# Patient Record
Sex: FEMALE | Race: WHITE | NOT HISPANIC OR LATINO | Employment: OTHER | URBAN - METROPOLITAN AREA
[De-identification: names, ages, dates, MRNs, and addresses within clinical notes are randomized per-mention and may not be internally consistent; named-entity substitution may affect disease eponyms.]

---

## 2017-04-24 ENCOUNTER — RADIANT APPOINTMENT (OUTPATIENT)
Dept: GENERAL RADIOLOGY | Facility: CLINIC | Age: 57
End: 2017-04-24
Attending: FAMILY MEDICINE
Payer: COMMERCIAL

## 2017-04-24 ENCOUNTER — OFFICE VISIT (OUTPATIENT)
Dept: FAMILY MEDICINE | Facility: CLINIC | Age: 57
End: 2017-04-24
Payer: COMMERCIAL

## 2017-04-24 VITALS
BODY MASS INDEX: 33.02 KG/M2 | WEIGHT: 198.2 LBS | OXYGEN SATURATION: 96 % | TEMPERATURE: 98.4 F | SYSTOLIC BLOOD PRESSURE: 110 MMHG | DIASTOLIC BLOOD PRESSURE: 80 MMHG | HEIGHT: 65 IN | HEART RATE: 74 BPM

## 2017-04-24 DIAGNOSIS — R05.9 COUGH: ICD-10-CM

## 2017-04-24 DIAGNOSIS — Z12.11 COLON CANCER SCREENING: ICD-10-CM

## 2017-04-24 DIAGNOSIS — R05.9 COUGH: Primary | ICD-10-CM

## 2017-04-24 PROCEDURE — 71020 XR CHEST 2 VW: CPT

## 2017-04-24 PROCEDURE — 99213 OFFICE O/P EST LOW 20 MIN: CPT | Performed by: FAMILY MEDICINE

## 2017-04-24 RX ORDER — PREDNISONE 20 MG/1
TABLET ORAL
Qty: 16 TABLET | Refills: 0 | Status: SHIPPED | OUTPATIENT
Start: 2017-04-24 | End: 2022-01-05

## 2017-04-24 RX ORDER — CODEINE PHOSPHATE AND GUAIFENESIN 10; 100 MG/5ML; MG/5ML
2 SOLUTION ORAL 4 TIMES DAILY PRN
Qty: 180 ML | Refills: 1 | Status: SHIPPED | OUTPATIENT
Start: 2017-04-24 | End: 2022-01-05

## 2017-04-24 RX ORDER — AZITHROMYCIN 250 MG/1
TABLET, FILM COATED ORAL
Qty: 6 TABLET | Refills: 0 | Status: SHIPPED | OUTPATIENT
Start: 2017-04-24 | End: 2022-01-05

## 2017-04-24 NOTE — MR AVS SNAPSHOT
After Visit Summary   4/24/2017    Xiomara Maradiaga    MRN: [id number]           Patient Information     Date Of Birth          1960        Visit Information        Provider Department      4/24/2017 6:20 PM Sb May MD [facility]        Today's Diagnoses     Cough    -  1    Colon cancer screening          Care Instructions    ASSESSMENT:   (R05) Cough  (primary encounter diagnosis)  Comment: possible pneumonia  Plan: XR Chest 2 Views, azithromycin (ZITHROMAX) 250         MG tablet, guaiFENesin-codeine (GUAIFENESIN AC)        100-10 MG/5ML SOLN solution, predniSONE         (DELTASONE) 20 MG tablet          Take prednisone 20mg 2 pills daily for 6 days, then 1 pill daily for another 4 days.   Azithromycin 250mg, 2 pills day #1, then 1 pill daily for 4 more days.   You can take 2 tsp of the guaifenesin/codeine cough syrup up to 4 times a day as needed.  It can cause drowsiness, caution during the day or at work or driving.        Sometimes there is irritation and inflammation in the airways following a viral upper respiratory infection and the cough can linger for longer than the usual 10 days, sometimes for several weeks.    Increased fluids and humidity in the household, particularly the bedroom can help.   Some of the older antihistamine medications like chlorpheneramine, brompheneramine or clemastine (Tavist) may help for the cough.  Sometimes these are combined with a decongestant like pseudoephedrine.  These over the counter medications are available without a prescription.   Cough suppressant medications like Guaifenesin (Robitussin) with dextromethorphan (DM) can help for cough.  This over the counter medication is available without a prescription.   Codeine with guaifenesin is another option which is prescription and could cause drowsiness but it is good for at night.  Both cough medications are 4 times a day as needed.   An inhaler like ipratropium could help.   This is 2 inhalations 4 times a day to reduce the inflammation.  Recheck or call with breathing problems, if a fever develops, or getting worse over time.      (Z12.11) Colon cancer screening  Comment:   Plan: Fecal colorectal cancer screen (FIT)        Complete FIT colon cancer screening test and send in the mail.          Follow-ups after your visit        Future tests that were ordered for you today     Open Future Orders        Priority Expected Expires Ordered    Fecal colorectal cancer screen (FIT) Routine 5/15/2017 7/17/2017 4/24/2017            Who to contact     If you have questions or need follow up information about today's clinic visit or your schedule please contact [facility] directly at [phone].  Normal or non-critical lab and imaging results will be communicated to you by Aceso[employer]eehart, letter or phone within 4 business days after the clinic has received the results. If you do not hear from us within 7 days, please contact the clinic through [employer] or phone. If you have a critical or abnormal lab result, we will notify you by phone as soon as possible.  Submit refill requests through [employer]inos or call your pharmacy and they will forward the refill request to us. Please allow 3 business days for your refill to be completed.          Additional Information About Your Visit        [email or web address] Information     Deminos gives you secure access to your electronic health record. If you see a primary care provider, you can also send messages to your care team and make appointments. If you have questions, please call your primary care clinic.  If you do not have a primary care provider, please call [phone] and they will assist you.        Care EveryWhere ID     This is your Care EveryWhere ID. This could be used by other organizations to access your [place] medical records  [id number]        Your Vitals Were     Pulse Temperature Height Last Period Pulse Oximetry Breastfeeding?    74 98.4  " F (36.9  C) (Tympanic) 5' 5\" (1.651 m) 10/04/2004 96% No    BMI (Body Mass Index)                   32.98 kg/m2            Blood Pressure from Last 3 Encounters:   04/24/17 110/80   08/29/15 113/62   05/29/13 120/82    Weight from Last 3 Encounters:   04/24/17 198 lb 3.2 oz (89.9 kg)   05/29/13 201 lb 3.2 oz (91.3 kg)   02/28/13 201 lb 14.4 oz (91.6 kg)                 Today's Medication Changes          These changes are accurate as of: 4/24/17  7:39 PM.  If you have any questions, ask your nurse or doctor.               Start taking these medicines.        Dose/Directions    azithromycin 250 MG tablet   Commonly known as:  ZITHROMAX   Used for:  Cough   Started by:  Sb May MD        Azithromycin 250mg, 2 pills day #1, then 1 pill daily for 4 more days.   Quantity:  6 tablet   Refills:  0       guaiFENesin-codeine 100-10 MG/5ML Soln solution   Commonly known as:  guaiFENesin AC   Used for:  Cough   Started by:  Sb May MD        Dose:  2 tsp.   Take 10 mLs by mouth 4 times daily as needed for cough   Quantity:  180 mL   Refills:  1       predniSONE 20 MG tablet   Commonly known as:  DELTASONE   Used for:  Cough   Started by:  Sb May MD        Take prednisone 20mg 2 pills daily for 6 days, then 1 pill daily for another 4 days.   Quantity:  16 tablet   Refills:  0            Where to get your medicines      These medications were sent to [facility] [id number] [place] [address]  AT [place] [address]  [address] [facility] [place] [address]     Phone:  [phone]     predniSONE 20 MG tablet         Some of these will need a paper prescription and others can be bought over the counter.  Ask your nurse if you have questions.     Bring a paper prescription for each of these medications     azithromycin 250 MG tablet    guaiFENesin-codeine 100-10 MG/5ML Soln solution                Primary Care Provider Office Phone # Fax #    Hallie Garcia " MD Yakov [phone]       [facility] [address] [facility] [address]        Thank you!     Thank you for choosing [facility]  for your care. Our goal is always to provide you with excellent care. Hearing back from our patients is one way we can continue to improve our services. Please take a few minutes to complete the written survey that you may receive in the mail after your visit with us. Thank you!             Your Updated Medication List - Protect others around you: Learn how to safely use, store and throw away your medicines at www.disposemymeds.org.          This list is accurate as of: 4/24/17  7:39 PM.  Always use your most recent med list.                   Brand Name Dispense Instructions for use    azithromycin 250 MG tablet    ZITHROMAX    6 tablet    Azithromycin 250mg, 2 pills day #1, then 1 pill daily for 4 more days.       guaiFENesin-codeine 100-10 MG/5ML Soln solution    guaiFENesin AC    180 mL    Take 10 mLs by mouth 4 times daily as needed for cough       NO ACTIVE MEDICATIONS          predniSONE 20 MG tablet    DELTASONE    16 tablet    Take prednisone 20mg 2 pills daily for 6 days, then 1 pill daily for another 4 days.

## 2017-04-24 NOTE — NURSING NOTE
"Chief Complaint   Patient presents with     Cough       Initial /80 (BP Location: Right arm, Patient Position: Chair, Cuff Size: Adult Large)  Pulse 74  Temp 98.4  F (36.9  C) (Tympanic)  Ht 5' 5\" (1.651 m)  Wt 198 lb 3.2 oz (89.9 kg)  LMP 10/04/2004  SpO2 96%  Breastfeeding? No  BMI 32.98 kg/m2 Estimated body mass index is 32.98 kg/(m^2) as calculated from the following:    Height as of this encounter: 5' 5\" (1.651 m).    Weight as of this encounter: 198 lb 3.2 oz (89.9 kg).  Medication Reconciliation: complete    Health Maintenance that is potentially due pending provider review:  Pap Smear, Colonoscopy/FIT and have bilateral implants due to breast cancer    Will consider but willing to FIT.  Margaret Vee CMA  .    "

## 2017-04-24 NOTE — PROGRESS NOTES
SUBJECTIVE:                                                    Xiomara Maradiaga is a 56 year old female who presents to clinic today for the following health issues:  Chief Complaint   Patient presents with     Cough      RESPIRATORY SYMPTOMS      Duration: 2.5 weeks    Description  sore throat, cough, fatigue/malaise and hoarse voice, headache with cough    Severity: severe    Accompanying signs and symptoms: feels slightly short of breath     History (predisposing factors):  Past hx of breast cancer and chemotherapy    Precipitating or alleviating factors: None    Therapies tried and outcome:  otc cough syrup and cough drops.       No history of asthma.  Perhaps a little hay fever.   Course of symptoms over time: worse.   Sometimes coughs prolonged making it difficult to breathe.   No fever.      NO nasal symptoms.   Dry cough.   Seems like dyspnea on exertion running with her dog.     Patient Active Problem List    Diagnosis Date Noted     Breast cancer (H) 06/25/2012     Priority: Medium     An ultrasound-guided biopsy was done on 05/22/2012 and results showed that there were 2 lesions with an invasive ductal carcinoma grade 2 with absent angiolymphatic invasion. Estrogen and progesterone ER and CO were also negative and HER-2 was negative as well so triple negative. The third nodule in the right breast that was biopsied was negative for atypia or malignancy. MRI has been done on 06/05/2012, which showed 3 enhancing masses in the lateral right breast, largest at the 10 o'clock position 8 cm from the nipple measuring about 2.3 x 1.9 and 2.1 cm. Satellite lesion present measuring 1.5 cm and a third mass was 2.3 cm anterior to the index lesion measuring 0.9 cm. No enlarged right axillary lymph nodes were seen. Left breast was normal. She has since been scheduled to have bilateral mastectomies with a right-sided sentinel lymph node biopsy and bilateral tissue expanders to be placed on 06/25/2012. EDSON GARZA MD  "           Personal history of malignant neoplasm of breast 05/31/2012     Priority: Medium      OBJECTIVE:Blood pressure 110/80, pulse 74, temperature 98.4  F (36.9  C), temperature source Tympanic, height 5' 5\" (1.651 m), weight 198 lb 3.2 oz (89.9 kg), last menstrual period 10/04/2004, SpO2 96 %, not currently breastfeeding. BMI=Body mass index is 32.98 kg/(m^2).  GENERAL APPEARANCE ADULT: Alert, no acute distress, frequent cough  EYES: PERRL, EOM normal, conjunctiva and lids normal  HENT: Ears and TMs normal, oral mucosa and posterior oropharynx normal  NECK: No adenopathy,masses or thyromegaly  RESP: rales left lung  CV: normal rate, regular rhythm, no murmur or gallop  CXR:Possbile small infiltrate left base     ASSESSMENT:   (R05) Cough  (primary encounter diagnosis)  Comment: possible pneumonia  Plan: XR Chest 2 Views, azithromycin (ZITHROMAX) 250         MG tablet, guaiFENesin-codeine (GUAIFENESIN AC)        100-10 MG/5ML SOLN solution, predniSONE         (DELTASONE) 20 MG tablet          Take prednisone 20mg 2 pills daily for 6 days, then 1 pill daily for another 4 days.   Azithromycin 250mg, 2 pills day #1, then 1 pill daily for 4 more days.   You can take 2 tsp of the guaifenesin/codeine cough syrup up to 4 times a day as needed.  It can cause drowsiness, caution during the day or at work or driving.        Sometimes there is irritation and inflammation in the airways following a viral upper respiratory infection and the cough can linger for longer than the usual 10 days, sometimes for several weeks.    Increased fluids and humidity in the household, particularly the bedroom can help.   Some of the older antihistamine medications like chlorpheneramine, brompheneramine or clemastine (Tavist) may help for the cough.  Sometimes these are combined with a decongestant like pseudoephedrine.  These over the counter medications are available without a prescription.   Cough suppressant medications like " Guaifenesin (Robitussin) with dextromethorphan (DM) can help for cough.  This over the counter medication is available without a prescription.   Codeine with guaifenesin is another option which is prescription and could cause drowsiness but it is good for at night.  Both cough medications are 4 times a day as needed.   An inhaler like ipratropium could help.  This is 2 inhalations 4 times a day to reduce the inflammation.  Recheck or call with breathing problems, if a fever develops, or getting worse over time.      (Z12.11) Colon cancer screening  Comment:   Plan: Fecal colorectal cancer screen (FIT)        Complete FIT colon cancer screening test and send in the mail.

## 2017-04-25 NOTE — PROGRESS NOTES
Derek Solano,  The radiologist does agree that on chest x-ray, it looks like there is some pneumonia.   PLAN: Continue the azithromycin and prednisone as planned.    Let me know if not steadily improving or increasing problems.     LEONARD BILLINGSLEY MD

## 2017-04-25 NOTE — PATIENT INSTRUCTIONS
ASSESSMENT:   (R05) Cough  (primary encounter diagnosis)  Comment: possible pneumonia  Plan: XR Chest 2 Views, azithromycin (ZITHROMAX) 250         MG tablet, guaiFENesin-codeine (GUAIFENESIN AC)        100-10 MG/5ML SOLN solution, predniSONE         (DELTASONE) 20 MG tablet          Take prednisone 20mg 2 pills daily for 6 days, then 1 pill daily for another 4 days.   Azithromycin 250mg, 2 pills day #1, then 1 pill daily for 4 more days.   You can take 2 tsp of the guaifenesin/codeine cough syrup up to 4 times a day as needed.  It can cause drowsiness, caution during the day or at work or driving.        Sometimes there is irritation and inflammation in the airways following a viral upper respiratory infection and the cough can linger for longer than the usual 10 days, sometimes for several weeks.    Increased fluids and humidity in the household, particularly the bedroom can help.   Some of the older antihistamine medications like chlorpheneramine, brompheneramine or clemastine (Tavist) may help for the cough.  Sometimes these are combined with a decongestant like pseudoephedrine.  These over the counter medications are available without a prescription.   Cough suppressant medications like Guaifenesin (Robitussin) with dextromethorphan (DM) can help for cough.  This over the counter medication is available without a prescription.   Codeine with guaifenesin is another option which is prescription and could cause drowsiness but it is good for at night.  Both cough medications are 4 times a day as needed.   An inhaler like ipratropium could help.  This is 2 inhalations 4 times a day to reduce the inflammation.  Recheck or call with breathing problems, if a fever develops, or getting worse over time.      (Z12.11) Colon cancer screening  Comment:   Plan: Fecal colorectal cancer screen (FIT)        Complete FIT colon cancer screening test and send in the mail.

## 2017-11-19 ENCOUNTER — HEALTH MAINTENANCE LETTER (OUTPATIENT)
Age: 57
End: 2017-11-19

## 2018-10-01 ENCOUNTER — TELEPHONE (OUTPATIENT)
Dept: FAMILY MEDICINE | Facility: CLINIC | Age: 58
End: 2018-10-01

## 2018-10-01 DIAGNOSIS — H04.123 DRY EYES: Primary | ICD-10-CM

## 2018-10-01 NOTE — TELEPHONE ENCOUNTER
I read the note.  I think we can start with a sedimentation rate.  If abnormal will have her come see me and talk about furhter testing.

## 2018-10-01 NOTE — TELEPHONE ENCOUNTER
Xiomara says she didn't do the lab draw today but would like to do it on Wednesday. She says if Dr Cox needs to talk to Dr Williamson from [facility], his phone number is [phone].  She would like someone to let her know if a test is ordered.

## 2018-10-01 NOTE — TELEPHONE ENCOUNTER
Pt says Dr Cox is her 's doctor. She saw an eye doctor. Dr Williamson and he asked her who her doctor was and she says she listed Dr Cox because she really doesn't have a doctor. She says the eye doctor contacted Dr Cox about ordering a blood test for her. She says the test is something to test for dry eyes. She didn't know what it was but says it's also for people with dry mouth, fatigue and dry eyes. She says she is on her way to [place] right now because her  is having his blood drawn and she wants to do her blood test at the same time so wants Dr Cox to order this. She says her  had called and was told Dr Cox did get contacted by the eye doctor.  Please put in orders and let her know ASAP.

## 2018-10-03 DIAGNOSIS — H04.123 DRY EYES: ICD-10-CM

## 2018-10-03 LAB — ERYTHROCYTE [SEDIMENTATION RATE] IN BLOOD BY WESTERGREN METHOD: 12 MM/H (ref 0–30)

## 2018-10-03 PROCEDURE — 85652 RBC SED RATE AUTOMATED: CPT | Performed by: FAMILY MEDICINE

## 2018-10-03 PROCEDURE — 36415 COLL VENOUS BLD VENIPUNCTURE: CPT | Performed by: FAMILY MEDICINE

## 2020-02-28 ENCOUNTER — TELEPHONE (OUTPATIENT)
Dept: FAMILY MEDICINE | Facility: CLINIC | Age: 60
End: 2020-02-28

## 2020-02-28 NOTE — TELEPHONE ENCOUNTER
S-(situation):  is calling to report that wife has diarrhea and is throwing up.   She has had diarrhea and vomiting every 30-40 minutes since 0600.  Abdominal pain 7-8/10.  Every time drinks something it triggers her to throw up.   She was fine last night.  This is sudden onset this morning.  Not sure color of urine.  Feels weak.    B-(background): had chicken from [employer] last night.    A-(assessment): vomiting and diarrhea every 30-40 minutes since 0600, unknown etiology    R-(recommendations): advised to try to hydrate.  If not and not urinating , needs fluids, go to the ED with a .  She says her  does not drive and she should not drive.  Reviewed general home care instructions for influenza like illness as well.  Margaret Berry RN

## 2020-02-28 NOTE — TELEPHONE ENCOUNTER
Reason for call:  Patient reporting a symptom    Symptom or request: Vomiting , Diarrhea & Chills - This started this am.    Phone Number patient can be reached at:  Home number on file [phone] (home)    Best Time:  Any Time      Can we leave a detailed message on this number:  YES    Call taken on 2/28/2020 at 2:03 PM by Stacie Marti

## 2020-03-02 ENCOUNTER — HEALTH MAINTENANCE LETTER (OUTPATIENT)
Age: 60
End: 2020-03-02

## 2020-10-01 ENCOUNTER — HOSPITAL ENCOUNTER (EMERGENCY)
Facility: CLINIC | Age: 60
Discharge: HOME | End: 2020-10-01
Attending: NURSE PRACTITIONER | Admitting: NURSE PRACTITIONER
Payer: COMMERCIAL

## 2020-10-01 VITALS
HEIGHT: 65 IN | RESPIRATION RATE: 18 BRPM | TEMPERATURE: 97.8 F | OXYGEN SATURATION: 99 % | SYSTOLIC BLOOD PRESSURE: 185 MMHG | HEART RATE: 61 BPM | DIASTOLIC BLOOD PRESSURE: 102 MMHG | BODY MASS INDEX: 32.49 KG/M2 | WEIGHT: 195 LBS

## 2020-10-01 DIAGNOSIS — S11.91XA LACERATION OF NECK, INITIAL ENCOUNTER: ICD-10-CM
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
## 2020-10-01 DIAGNOSIS — S61.012A LACERATION OF LEFT THUMB WITHOUT FOREIGN BODY WITHOUT DAMAGE TO NAIL, INITIAL ENCOUNTER: ICD-10-CM

## 2020-10-01 PROCEDURE — 12004 RPR S/N/AX/GEN/TRK7.6-12.5CM: CPT | Performed by: NURSE PRACTITIONER

## 2020-10-01 PROCEDURE — 90471 IMMUNIZATION ADMIN: CPT

## 2020-10-01 PROCEDURE — 99283 EMERGENCY DEPT VISIT LOW MDM: CPT | Mod: 25 | Performed by: NURSE PRACTITIONER

## 2020-10-01 PROCEDURE — 12042 INTMD RPR N-HF/GENIT2.6-7.5: CPT | Performed by: NURSE PRACTITIONER

## 2020-10-01 PROCEDURE — 90715 TDAP VACCINE 7 YRS/> IM: CPT | Performed by: NURSE PRACTITIONER

## 2020-10-01 PROCEDURE — 250N000011 HC RX IP 250 OP 636: Performed by: NURSE PRACTITIONER

## 2020-10-01 PROCEDURE — 90471 IMMUNIZATION ADMIN: CPT | Performed by: NURSE PRACTITIONER

## 2020-10-01 PROCEDURE — 250N000013 HC RX MED GY IP 250 OP 250 PS 637: Performed by: NURSE PRACTITIONER

## 2020-10-01 RX ORDER — DOXYCYCLINE 100 MG/1
100 CAPSULE ORAL ONCE
Status: COMPLETED | OUTPATIENT
Start: 2020-10-01 | End: 2020-10-01

## 2020-10-01 RX ORDER — DOXYCYCLINE 100 MG/1
100 TABLET ORAL 2 TIMES DAILY
Qty: 10 TABLET | Refills: 0 | Status: SHIPPED | OUTPATIENT
Start: 2020-10-01 | End: 2020-10-06

## 2020-10-01 RX ADMIN — DOXYCYCLINE HYCLATE 100 MG: 100 CAPSULE ORAL at 22:21

## 2020-10-01 RX ADMIN — CLOSTRIDIUM TETANI TOXOID ANTIGEN (FORMALDEHYDE INACTIVATED), CORYNEBACTERIUM DIPHTHERIAE TOXOID ANTIGEN (FORMALDEHYDE INACTIVATED), BORDETELLA PERTUSSIS TOXOID ANTIGEN (GLUTARALDEHYDE INACTIVATED), BORDETELLA PERTUSSIS FILAMENTOUS HEMAGGLUTININ ANTIGEN (FORMALDEHYDE INACTIVATED), BORDETELLA PERTUSSIS PERTACTIN ANTIGEN, AND BORDETELLA PERTUSSIS FIMBRIAE 2/3 ANTIGEN 0.5 ML: 5; 2; 2.5; 5; 3; 5 INJECTION, SUSPENSION INTRAMUSCULAR at 22:20

## 2020-10-01 ASSESSMENT — ENCOUNTER SYMPTOMS
WOUND: 1
COUGH: 0
SHORTNESS OF BREATH: 0
FEVER: 0

## 2020-10-01 ASSESSMENT — MIFFLIN-ST. JEOR: SCORE: 1460.39

## 2020-10-01 NOTE — ED AVS SNAPSHOT
[facility] Emergency Dept  [address] [address]  Phone: [phone]  Fax: [phone]                                    Xiomara Maradiaga   MRN: [id number]    Department: [facility] Emergency [facility]   Date of Visit: 10/1/2020           After Visit Summary Signature Page    I have received my discharge instructions, and my questions have been answered. I have discussed any challenges I see with this plan with the nurse or doctor.    ..........................................................................................................................................  Patient/Patient Representative Signature      ..........................................................................................................................................  Patient Representative Print Name and Relationship to Patient    ..................................................               ................................................  Date                                   Time    ..........................................................................................................................................  Reviewed by Signature/Title    ...................................................              ..............................................  Date                                               Time          22EPIC Rev 08/18

## 2020-10-02 NOTE — DISCHARGE INSTRUCTIONS
Keep the wounds clean and dry.   Thumb splint.   Follow-up with orthopedics this week for recheck of your thumb laceration which is concerning for tendon laceration. Referral has been sent. They should call you within 24 hours or you can contact them to set-up appointment [phone].    Follow-up in clinic in 10 days for suture removal.  Return to the emergency department for any signs of infection from any of the lacerations (fevers, increased redness, swelling or increased pain.)

## 2020-10-02 NOTE — ED NOTES
"Pt states she was wrestling with her friends dog. States the dog \"got spooked\" when she made a noise. States the dogs immunizations are up to date. Denies respiratory distress or pain with swallowing.   "

## 2020-10-02 NOTE — ED PROVIDER NOTES
History     Chief Complaint   Patient presents with     Dog Bite     HPI  Xiomara Maradiaga is a 59 year old female who presents to the emergency department for evaluation of dog bites to her left thumb and her neck.  Patient states she was wrestling around with her friend's dog and thinks the dog might have been spooked by some loud noise and got scared and bit her.  Patient suffered a laceration to her left thumb and 3 lacerations to her neck.  Bleeding is controlled.  Patient's tetanus is not up-to-date.  Per patient the dog is vaccinated for rabies.    Allergies:  Allergies   Allergen Reactions     Bees Hives     Penicillins Rash       Problem List:    Patient Active Problem List    Diagnosis Date Noted     Breast cancer (H) 06/25/2012     Priority: Medium     An ultrasound-guided biopsy was done on 05/22/2012 and results showed that there were 2 lesions with an invasive ductal carcinoma grade 2 with absent angiolymphatic invasion. Estrogen and progesterone ER and OH were also negative and HER-2 was negative as well so triple negative. The third nodule in the right breast that was biopsied was negative for atypia or malignancy. MRI has been done on 06/05/2012, which showed 3 enhancing masses in the lateral right breast, largest at the 10 o'clock position 8 cm from the nipple measuring about 2.3 x 1.9 and 2.1 cm. Satellite lesion present measuring 1.5 cm and a third mass was 2.3 cm anterior to the index lesion measuring 0.9 cm. No enlarged right axillary lymph nodes were seen. Left breast was normal. She has since been scheduled to have bilateral mastectomies with a right-sided sentinel lymph node biopsy and bilateral tissue expanders to be placed on 06/25/2012. EDSON GARZA MD            Personal history of malignant neoplasm of breast 05/31/2012     Priority: Medium        Past Medical History:    Past Medical History:   Diagnosis Date     Malignant neoplasm (H) 5/22/2012       Past Surgical History:    Past  Surgical History:   Procedure Laterality Date     C ORAL SURGERY PROCEDURE  1996     INSERT PORT VASCULAR ACCESS  6/25/2012    Procedure: INSERT PORT VASCULAR ACCESS;;  Surgeon: Hallie Parker MD;  Location:  OR     INSERT TISSUE EXPANDER BREAST BILATERAL  6/25/2012    Procedure: INSERT TISSUE EXPANDER BREAST BILATERAL;;  Surgeon: Jake Aranda MD;  Location:  OR     MASTECTOMY SIMPLE, SENTINEL NODE, COMBINED  6/25/2012    Procedure: COMBINED MASTECTOMY SIMPLE, SENTINEL NODE;  BILATERAL  MASTECTOMY WITH RIGHT AXILLARY SENTINEL NODE BIOPSY, PORT PLACEMENT (YONATAN)  BILATERAL FIRST STAGE BREAST RECONSTRUCTION WITH TISSUE EXPANDERS (DEJAN);  Surgeon: Hallie Parker MD;  Location:  OR     RECONSTRUCT BREAST BILATERAL  2/28/2013    Procedure: RECONSTRUCT BREAST BILATERAL;  BILATERAL SECOND STAGE BREAST RECONSTRUCTION WITH SILICONE IMPLANTS, PORT REMOVAL.;  Surgeon: Jake Aranda MD;  Location: [facility]     REMOVE PORT VASCULAR ACCESS  2/28/2013    Procedure: REMOVE PORT VASCULAR ACCESS;;  Surgeon: Jake Aranda MD;  Location: [facility]       Family History:    Family History   Problem Relation Age of Onset     Breast Cancer Mother      Heart Disease Maternal Grandfather         heart attack in his 70's     Diabetes Paternal Grandmother      Diabetes Paternal Grandfather        Social History:  Marital Status:   [2]  Social History     Tobacco Use     Smoking status: Never Smoker   Substance Use Topics     Alcohol use: Not on file     Drug use: No        Medications:         doxycycline monohydrate (ADOXA) 100 MG tablet       azithromycin (ZITHROMAX) 250 MG tablet       guaiFENesin-codeine (GUAIFENESIN AC) 100-10 MG/5ML SOLN solution       NO ACTIVE MEDICATIONS       predniSONE (DELTASONE) 20 MG tablet          Review of Systems   Constitutional: Negative for fever.   Respiratory: Negative for cough and shortness of breath.    Skin: Positive for wound.       Physical Exam   BP: (!)  "185/102  Pulse: 61  Temp: 97.8  F (36.6  C)  Resp: 18  Height: 165.1 cm (5' 5\")  Weight: 88.5 kg (195 lb)  SpO2: 99 %      Physical Exam  Constitutional:       General: She is not in acute distress.     Appearance: She is not ill-appearing.   Neck:     Musculoskeletal:      Left hand: She exhibits laceration (thumb).        Hands:    Neurological:      Mental Status: She is alert.           ED Course        [facility]     -Laceration Repair    Date/Time: 10/1/2020 9:45 PM  Performed by: Judy Guy APRN CNP  Authorized by: Judy Guy APRN CNP     LACERATION DETAILS     Location:  Finger    Finger location:  L thumb    Length (cm):  4    REPAIR TYPE:     Repair type:  Simple      EXPLORATION:     Wound exploration: wound explored through full range of motion and entire depth of wound probed and visualized      Wound extent: tendon damage      Wound extent: no foreign body      Tendon damage location:  Upper extremity    Upper extremity tendon damage location:  Finger extensor (appears to be partially disrupted.)    Tendon damage extent:  Partial transection    Tendon repair plan:  Refer for evaluation    Contaminated: no      TREATMENT:     Area cleansed with:  Saline    Amount of cleaning:  Extensive    SKIN REPAIR     Repair method:  Sutures    Suture size:  3-0    Suture material:  Nylon    Number of sutures:  5    APPROXIMATION     Approximation:  Loose    POST-PROCEDURE DETAILS     Dressing:  Antibiotic ointment, splint for protection and non-adherent dressing      PROCEDURE   Patient Tolerance:  Patient tolerated the procedure well with no immediate complications    [facility]     -Laceration Repair    Date/Time: 10/1/2020 10:50 PM  Performed by: Judy Guy APRN CNP  Authorized by: Judy Guy APRN CNP     LACERATION DETAILS     Location:  Neck    Neck location: Right anterior x2, Left anterior x1.    Wound " length (cm): RIGHT anterior/superior 3cm, RIGHT anterior/inferior 2cm, LEFT anterior 3cm.    REPAIR TYPE:     Repair type:  Simple      EXPLORATION:     Wound exploration: wound explored through full range of motion and entire depth of wound probed and visualized      Contaminated: no      TREATMENT:     Area cleansed with:  Saline    SKIN REPAIR     Repair method:  Sutures    Suture size:  3-0    Number of sutures:  5    APPROXIMATION     Approximation:  Loose    POST-PROCEDURE DETAILS     Dressing:  Antibiotic ointment and non-adherent dressing      PROCEDURE   Patient Tolerance:  Patient tolerated the procedure well with no immediate complications                     No results found for this or any previous visit (from the past 24 hour(s)).    Medications   doxycycline hyclate (VIBRAMYCIN) capsule 100 mg (100 mg Oral Given 10/1/20 2221)   Tdap (tetanus-diphtheria-acell pertussis) (ADACEL) injection 0.5 mL (0.5 mLs Intramuscular Given 10/1/20 2220)       Assessments & Plan (with Medical Decision Making)   59-year-old female with laceration to her left thumb and 3 lacerations to her anterior neck caused by dog bite that occurred today.  This was her friend's dog.  Patient states the dog is current on rabies vaccination.  Lacerations were loosely repaired as noted above.  The left thumb wound is concerning for partial fraying of the extensor tendon.  Patient has normal range of motion and strength against resistance with flexion and extension.  Recommend close follow-up with orthopedics for recheck.  Patient will be treated with prophylactic antibiotics and was given her first dose of doxycycline.  Tetanus was updated today.      Plan as follows:  Keep the wounds clean and dry.   Thumb splint.   Follow-up with orthopedics this week for recheck of your thumb laceration which is concerning for tendon laceration. Referral has been sent. They should call you within 24 hours or you can contact them to set-up appointment  [phone].  Follow-up in clinic in 10 days for suture removal.  Return to the emergency department for any signs of infection from any of the lacerations (fevers, increased redness, swelling or increased pain.)  I have reviewed the nursing notes.    I have reviewed the findings, diagnosis, plan and need for follow up with the patient.      New Prescriptions    DOXYCYCLINE MONOHYDRATE (ADOXA) 100 MG TABLET    Take 1 tablet (100 mg) by mouth 2 times daily for 5 days       Final diagnoses:   Dog bite, initial encounter   Laceration of left thumb without foreign body without damage to nail, initial encounter - concerning for partial tendon laceration   Laceration of neck, initial encounter       10/1/2020   [facility] EMERGENCY DEPT     Malena, SUZE Kam CNP  10/01/20 [id number]

## 2020-10-05 ENCOUNTER — ALLIED HEALTH/NURSE VISIT (OUTPATIENT)
Dept: FAMILY MEDICINE | Facility: CLINIC | Age: 60
End: 2020-10-05
Payer: COMMERCIAL

## 2020-10-05 DIAGNOSIS — Z23 NEED FOR PROPHYLACTIC VACCINATION AND INOCULATION AGAINST INFLUENZA: Primary | ICD-10-CM

## 2020-10-05 PROCEDURE — 90471 IMMUNIZATION ADMIN: CPT

## 2020-10-05 PROCEDURE — 90682 RIV4 VACC RECOMBINANT DNA IM: CPT

## 2020-10-12 ENCOUNTER — ALLIED HEALTH/NURSE VISIT (OUTPATIENT)
Dept: FAMILY MEDICINE | Facility: CLINIC | Age: 60
End: 2020-10-12
Payer: COMMERCIAL

## 2020-10-12 DIAGNOSIS — Z48.02 VISIT FOR SUTURE REMOVAL: Primary | ICD-10-CM

## 2020-10-12 PROCEDURE — 99207 PR NO CHARGE NURSE ONLY: CPT

## 2020-10-12 NOTE — NURSING NOTE
Xiomara Maradiaga presents to the clinic today for removal of sutures.  The patient has had the sutures in place for 11 days.  There has been no history of infection or drainage.  5 sutures are seen located on the left thumb and 5 sutures on the neck  The wound is healing well with no signs of infection.  Tetanus status is up to date.   All sutures were easily removed today.  Routine wound care discussed.  The patient will follow up as needed.

## 2021-04-18 ENCOUNTER — HEALTH MAINTENANCE LETTER (OUTPATIENT)
Age: 61
End: 2021-04-18

## 2021-10-02 ENCOUNTER — HEALTH MAINTENANCE LETTER (OUTPATIENT)
Age: 61
End: 2021-10-02

## 2021-11-15 ENCOUNTER — ALLIED HEALTH/NURSE VISIT (OUTPATIENT)
Dept: FAMILY MEDICINE | Facility: CLINIC | Age: 61
End: 2021-11-15
Payer: COMMERCIAL

## 2021-11-15 DIAGNOSIS — Z23 HIGH PRIORITY FOR 2019-NCOV VACCINE: Primary | ICD-10-CM

## 2021-11-15 PROCEDURE — 0064A COVID-19,PF,MODERNA (18+ YRS BOOSTER .25ML): CPT

## 2021-11-15 PROCEDURE — 99207 PR NO CHARGE LOS: CPT

## 2021-11-15 PROCEDURE — 91306 COVID-19,PF,MODERNA (18+ YRS BOOSTER .25ML): CPT

## 2021-11-29 ENCOUNTER — IMMUNIZATION (OUTPATIENT)
Dept: FAMILY MEDICINE | Facility: CLINIC | Age: 61
End: 2021-11-29
Payer: COMMERCIAL

## 2021-11-29 DIAGNOSIS — Z23 NEED FOR PROPHYLACTIC VACCINATION AND INOCULATION AGAINST INFLUENZA: Primary | ICD-10-CM

## 2021-11-29 PROCEDURE — 90682 RIV4 VACC RECOMBINANT DNA IM: CPT

## 2021-11-29 PROCEDURE — 90471 IMMUNIZATION ADMIN: CPT

## 2021-11-29 PROCEDURE — 99207 PR NO CHARGE NURSE ONLY: CPT

## 2022-01-05 ENCOUNTER — NURSE TRIAGE (OUTPATIENT)
Dept: NURSING | Facility: CLINIC | Age: 62
End: 2022-01-05
Payer: COMMERCIAL

## 2022-01-05 ENCOUNTER — VIRTUAL VISIT (OUTPATIENT)
Dept: INTERNAL MEDICINE | Facility: CLINIC | Age: 62
End: 2022-01-05
Payer: COMMERCIAL

## 2022-01-05 DIAGNOSIS — J40 BRONCHITIS: Primary | ICD-10-CM

## 2022-01-05 DIAGNOSIS — R05.9 COUGH: ICD-10-CM

## 2022-01-05 PROCEDURE — 99203 OFFICE O/P NEW LOW 30 MIN: CPT | Mod: 95 | Performed by: INTERNAL MEDICINE

## 2022-01-05 RX ORDER — CODEINE PHOSPHATE AND GUAIFENESIN 10; 100 MG/5ML; MG/5ML
1 SOLUTION ORAL AT BEDTIME
Qty: 180 ML | Refills: 0 | Status: SHIPPED | OUTPATIENT
Start: 2022-01-05 | End: 2024-04-08

## 2022-01-05 RX ORDER — METHYLPREDNISOLONE 4 MG
TABLET, DOSE PACK ORAL
Qty: 21 TABLET | Refills: 0 | Status: SHIPPED | OUTPATIENT
Start: 2022-01-05 | End: 2024-04-08

## 2022-01-05 NOTE — TELEPHONE ENCOUNTER
Triage Call:    Pt calling because she has had a dry cough for over a week and she is wanting to get a covid-19 test to ensure that she isn't spreading covid to her coworkers etc.  No fever and no other symptoms     Pt took cough medication last night    Disposition: Telehealth appt. Pt was given care advice and transferred to scheduling to make a virtual appt.     Jocelyne Brown RN  [facility] Nurse Advisor 9:18 AM 1/5/2022    Reason for Disposition    HIGH RISK for severe COVID complications (e.g., age > 64 years, obesity with BMI > 25, pregnant, chronic lung disease or other chronic medical condition)  (Exception: Already seen by PCP and no new or worsening symptoms.)    Additional Information    Negative: SEVERE difficulty breathing (e.g., struggling for each breath, speaks in single words)    Negative: Difficult to awaken or acting confused (e.g., disoriented, slurred speech)    Negative: Bluish (or gray) lips or face now    Negative: Shock suspected (e.g., cold/pale/clammy skin, too weak to stand, low BP, rapid pulse)    Negative: Sounds like a life-threatening emergency to the triager    Negative: SEVERE or constant chest pain or pressure (Exception: mild central chest pain, present only when coughing)    Negative: MODERATE difficulty breathing (e.g., speaks in phrases, SOB even at rest, pulse 100-120)    Negative: [1] Headache AND [2] stiff neck (can't touch chin to chest)    Negative: MILD difficulty breathing (e.g., minimal/no SOB at rest, SOB with walking, pulse <100)    Negative: Chest pain or pressure    Negative: Patient sounds very sick or weak to the triager    Negative: Fever > 103 F (39.4 C)    Negative: [1] Fever > 101 F (38.3 C) AND [2] age > 60 years    Negative: [1] Fever > 100.0 F (37.8 C) AND [2] bedridden (e.g., nursing home patient, CVA, chronic illness, recovering from surgery)    Negative: [1] COVID-19 exposure AND [2] no symptoms    Negative: COVID-19 vaccine reaction suspected (e.g.,  fever, headache, muscle aches) occurring 1 to 3 days after getting vaccine    Negative: COVID-19 vaccine, questions about    Negative: [1] Lives with someone known to have influenza (flu test positive) AND [2] flu-like symptoms (e.g., cough, runny nose, sore throat, SOB; with or without fever)    Negative: [1] Adult with possible COVID-19 symptoms AND [2] triager concerned about severity of symptoms or other causes    Negative: COVID-19 and breastfeeding, questions about    Protocols used: CORONAVIRUS (COVID-19) DIAGNOSED OR [id number] 8.25.2021    COVID 19 Nurse Triage Plan/Patient Instructions    Please be aware that novel coronavirus (COVID-19) may be circulating in the community. If you develop symptoms such as fever, cough, or SOB or if you have concerns about the presence of another infection including coronavirus (COVID-19), please contact your health care provider or visit [email or web address].     Disposition/Instructions    Virtual Visit with provider recommended. Reference Visit Selection Guide.    Thank you for taking steps to prevent the spread of this virus.  o Limit your contact with others.  o Wear a simple mask to cover your cough.  o Wash your hands well and often.    Resources    [facility]: About COVID-19: [email or web address]    CDC: What to Do If You're Sick: www.cdc.gov/coronavirus/2019-ncov/about/steps-when-sick.html    CDC: Ending Home Isolation: www.cdc.gov/coronavirus/2019-ncov/hcp/disposition-in-home-patients.html     CDC: Caring for Someone: www.cdc.gov/coronavirus/2019-ncov/if-you-are-sick/care-for-someone.html     [facility]: Interim Guidance for Hospital Discharge to Home: www.health.[facility].[place]/diseases/coronavirus/hcp/hospdischarge.pdf    [facility] clinical trials (COVID-19 research studies): [email or web address]/[email or web address]     Below are the COVID-19 hotlines at the [place] Department of [facility] ([facility]). Interpreters are available.    o For health questions: Call 008-257-0922 or 1-175.614.7484 (7 a.m. to 7 p.m.)  o For questions about schools and childcare: Call 828-603-8792 or 1-970.774.3627 (7 a.m. to 7 p.m.)

## 2022-01-05 NOTE — PROGRESS NOTES
Xiomara is a 61 year old female contacting the clinic today via video, who will use the platform: simplifyMD for the visit.  Phone # for Doximity, or if Amwell drops:   Telephone Information:   Mobile [phone]          ASSESSMENT and PLAN:  1. Cough  Will screen for Covid as soon as possible.  Suspect mild viral bronchitis.  For now she will try cough syrup at night and Medrol Dosepak, if she is not improving or feeling worse she will let me know.  - guaiFENesin-codeine (GUAIFENESIN AC) 100-10 MG/5ML solution; Take 5 mLs by mouth At Bedtime  Dispense: 180 mL; Refill: 0  - methylPREDNISolone (MEDROL DOSEPAK) 4 MG tablet therapy pack; Follow Package Directions  Dispense: 21 tablet; Refill: 0  - Symptomatic; Unknown COVID-19 Virus (Coronavirus) by PCR; Future    2. Bronchitis  - methylPREDNISolone (MEDROL DOSEPAK) 4 MG tablet therapy pack; Follow Package Directions  Dispense: 21 tablet; Refill: 0  - Symptomatic; Unknown COVID-19 Virus (Coronavirus) by PCR; Future     Medication list carefully reviewed and corrected  Epic Merger Problem list addressed and updated       CHIEF COMPLAINT:  Chief Complaint   Patient presents with     Lower Respiratory Infection     non productive cough x 1 week;        HISTORY OF PRESENT ILLNESS:  Xiomara is a 61 year old female contacting the clinic today via video for Covid screening.    Patient has history of right breast cancer, no other health issues.  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  She does work with the customers and would like to get screened for Covid as soon as possible, she is unable to obtain Covid screening anywhere else.    Symptoms started 1 week ago.  She developed scratchy throat and nasal congestion and nonproductive cough.  She denies any fevers or chills, no sinus congestion, no shortness of breath or pleurisy.  She has no history of asthma or smoking.  She has had 3 Covid vaccines and a flu vaccine recently.  Over-the-counter she  "has tried TheraFlu which helps.  She has no pets at home.  Overall she reports frequent cough in the winter yearly.    REVIEW OF SYSTEMS:   As above    PFSH:  Social History     Social History Narrative     Not on file       TOBACCO USE:  History   Smoking Status     Never Smoker   Smokeless Tobacco     Never Used       VITALS:  There were no vitals filed for this visit.  LMP 10/04/2004  Estimated body mass index is 32.45 kg/m  as calculated from the following:    Height as of 10/1/20: 1.651 m (5' 5\").    Weight as of 10/1/20: 88.5 kg (195 lb).    PHYSICAL EXAM:  (observations via Video)  Pleasant female, awake alert and oriented, no acute distress, affect is appropriate, concentration is good.  No shortness of breath, cough, respiratory difficulty or significant nasal congestion noted.    MEDICATIONS:   Current Outpatient Medications   Medication Sig Dispense Refill     azithromycin (ZITHROMAX) 250 MG tablet Azithromycin 250mg, 2 pills day #1, then 1 pill daily for 4 more days. 6 tablet 0     guaiFENesin-codeine (GUAIFENESIN AC) 100-10 MG/5ML SOLN solution Take 10 mLs by mouth 4 times daily as needed for cough 180 mL 1     NO ACTIVE MEDICATIONS        predniSONE (DELTASONE) 20 MG tablet Take prednisone 20mg 2 pills daily for 6 days, then 1 pill daily for another 4 days. (Patient not taking: Reported on 1/5/2022) 16 tablet 0       Patient has given verbal consent to a Video visit?  Yes  How would you like to obtain your AVS?  MyChart  Will anyone else be joining your video visit? No       Video-Visit Details  Type of service:  Video Visit  Originating Location (pt. Location): Home  Distant Location (provider location):   [facility]    ANA SANDHU LPN  [facility]    Video Start Time: 11:28 AM  Video End time: 11:40 AM  Face to face plus orders: 12 minutes  Documentation time:  3 minutes   No LOS data to display      The visit lasted a total of 12 " minutes

## 2022-05-14 ENCOUNTER — HEALTH MAINTENANCE LETTER (OUTPATIENT)
Age: 62
End: 2022-05-14

## 2022-08-01 ENCOUNTER — IMMUNIZATION (OUTPATIENT)
Dept: NURSING | Facility: CLINIC | Age: 62
End: 2022-08-01
Payer: COMMERCIAL

## 2022-08-01 PROCEDURE — 91306 COVID-19,PF,MODERNA (18+ YRS BOOSTER .25ML): CPT

## 2022-08-01 PROCEDURE — 0064A COVID-19,PF,MODERNA (18+ YRS BOOSTER .25ML): CPT

## 2022-09-03 ENCOUNTER — HEALTH MAINTENANCE LETTER (OUTPATIENT)
Age: 62
End: 2022-09-03

## 2022-12-26 ENCOUNTER — IMMUNIZATION (OUTPATIENT)
Dept: FAMILY MEDICINE | Facility: CLINIC | Age: 62
End: 2022-12-26
Payer: COMMERCIAL

## 2022-12-26 DIAGNOSIS — Z23 NEED FOR PROPHYLACTIC VACCINATION AND INOCULATION AGAINST INFLUENZA: Primary | ICD-10-CM

## 2022-12-26 PROCEDURE — 90471 IMMUNIZATION ADMIN: CPT

## 2022-12-26 PROCEDURE — 90682 RIV4 VACC RECOMBINANT DNA IM: CPT

## 2022-12-26 PROCEDURE — 99207 PR NO CHARGE NURSE ONLY: CPT

## 2023-06-03 ENCOUNTER — HEALTH MAINTENANCE LETTER (OUTPATIENT)
Age: 63
End: 2023-06-03

## 2024-01-08 ENCOUNTER — OFFICE VISIT (OUTPATIENT)
Dept: DERMATOLOGY | Facility: CLINIC | Age: 64
End: 2024-01-08
Payer: COMMERCIAL

## 2024-01-08 DIAGNOSIS — L82.0 INFLAMED SEBORRHEIC KERATOSIS: ICD-10-CM

## 2024-01-08 DIAGNOSIS — L81.4 LENTIGO: ICD-10-CM

## 2024-01-08 DIAGNOSIS — D18.01 ANGIOMA OF SKIN: ICD-10-CM

## 2024-01-08 DIAGNOSIS — D22.9 NEVUS: Primary | ICD-10-CM

## 2024-01-08 DIAGNOSIS — L82.1 SEBORRHEIC KERATOSIS: ICD-10-CM

## 2024-01-08 PROCEDURE — 17110 DESTRUCTION B9 LES UP TO 14: CPT | Performed by: PHYSICIAN ASSISTANT

## 2024-01-08 PROCEDURE — 99203 OFFICE O/P NEW LOW 30 MIN: CPT | Mod: 25 | Performed by: PHYSICIAN ASSISTANT

## 2024-01-08 NOTE — LETTER
1/8/2024         RE: Xiomara Maradiaga  [address] [place] [address]        Dear Colleague,    Thank you for referring your patient, Xiomara Maradiaga, to the [facility]. Please see a copy of my visit note below.    HPI:   Chief complaints: Xiomara Maradiaga is a pleasant 63 year old female who presents for Above Waist skin cancer screening to rule out skin cancer   Last Skin Exam: n/a      1st Baseline: yes  Personal HX of Skin Cancer: no   Personal HX of Malignant Melanoma: no   Family HX of Skin Cancer / Malignant Melanoma: no  Personal HX of Atypical Moles:   no  Risk factors: history of sun exposure and burns  New / Changing lesions:yes new spots on the back and abdomen  Social History:   On review of systems, there are no further skin complaints, patient is feeling otherwise well.   ROS of the following were done and are negative: Constitutional, Eyes, Ears, Nose,   Mouth, Throat, Cardiovascular, Respiratory, GI, Genitourinary, Musculoskeletal,   Psychiatric, Endocrine, Allergic/Immunologic.    PHYSICAL EXAM:   LMP 10/04/2004   Skin exam performed as follows: Type 2 skin. Mood appropriate  Alert and Oriented X 3. Well developed, well nourished in no distress.  General appearance: Normal  Head including face: Normal  Eyes: conjunctiva and lids: Normal  Mouth: Lips, teeth, gums: Normal  Neck: Normal  Chest-breast/axillae: Normal  Back: Normal  Extremities: digits/nails (clubbing): Normal  Eccrine and Apocrine glands: Normal  Right upper extremity: Normal  Left upper extremity: Normal  Right lower extremity: Normal  Left lower extremity: Normal  Skin: Scalp and body hair: See below    Pt deferred exam of breasts, groin, buttocks: YES    Other physical findings:  1. Multiple pigmented macules on extremities and trunk  2. Multiple pigmented macules on face, trunk and extremities  3. Multiple vascular papules on trunk, arms and legs  4. Multiple scattered keratotic plaques'  5. Inflamed  keratotic papules on the left back x 3, abdomen x 2, right temple x 1       Except as noted above, no other signs of skin cancer or melanoma.     ASSESSMENT/PLAN:   Benign Above Waist skin cancer screening today. . Patient with history of none  Advised on monthly self exams and 1 year  Patient Education: Appropriate brochures given.    Multiple benign appearing melanocytic nevi on arms, legs and trunk. Discussed ABCDEs of melanoma and sunscreen.   Multiple lentigos on arms, legs and trunk. Advised benign, no treatment needed.  Multiple scattered angiomas. Advised benign, no treatment needed.   Seborrheic keratosis on arms, legs and trunk. Advised benign, no treatment needed.  Inflamed seborrheic keratosis on the left back x 3, abdomen x 2, right temple x 1. As physically tender cryosurgery performed. Advised on post op care.             Follow-up: FSE every 2-3 years/PRN sooner    1.) Patient was asked about new and changing moles. YES  2.) Patient received a complete physical skin examination: YES  3.) Patient was counseled to perform a monthly self skin examination: YES  Scribed By: Bee Cornelius, MS, PAJonasC      Again, thank you for allowing me to participate in the care of your patient.        Sincerely,        Bee Cornelius PA-C

## 2024-01-08 NOTE — PROGRESS NOTES
HPI:   Chief complaints: Xiomara Maradiaga is a pleasant 63 year old female who presents for Above Waist skin cancer screening to rule out skin cancer   Last Skin Exam: n/a      1st Baseline: yes  Personal HX of Skin Cancer: no   Personal HX of Malignant Melanoma: no   Family HX of Skin Cancer / Malignant Melanoma: no  Personal HX of Atypical Moles:   no  Risk factors: history of sun exposure and burns  New / Changing lesions:yes new spots on the back and abdomen  Social History:   On review of systems, there are no further skin complaints, patient is feeling otherwise well.   ROS of the following were done and are negative: Constitutional, Eyes, Ears, Nose,   Mouth, Throat, Cardiovascular, Respiratory, GI, Genitourinary, Musculoskeletal,   Psychiatric, Endocrine, Allergic/Immunologic.    PHYSICAL EXAM:   LMP 10/04/2004   Skin exam performed as follows: Type 2 skin. Mood appropriate  Alert and Oriented X 3. Well developed, well nourished in no distress.  General appearance: Normal  Head including face: Normal  Eyes: conjunctiva and lids: Normal  Mouth: Lips, teeth, gums: Normal  Neck: Normal  Chest-breast/axillae: Normal  Back: Normal  Extremities: digits/nails (clubbing): Normal  Eccrine and Apocrine glands: Normal  Right upper extremity: Normal  Left upper extremity: Normal  Right lower extremity: Normal  Left lower extremity: Normal  Skin: Scalp and body hair: See below    Pt deferred exam of breasts, groin, buttocks: YES    Other physical findings:  1. Multiple pigmented macules on extremities and trunk  2. Multiple pigmented macules on face, trunk and extremities  3. Multiple vascular papules on trunk, arms and legs  4. Multiple scattered keratotic plaques'  5. Inflamed keratotic papules on the left back x 3, abdomen x 2, right temple x 1       Except as noted above, no other signs of skin cancer or melanoma.     ASSESSMENT/PLAN:   Benign Above Waist skin cancer screening today. . Patient with history of  none  Advised on monthly self exams and 1 year  Patient Education: Appropriate brochures given.    Multiple benign appearing melanocytic nevi on arms, legs and trunk. Discussed ABCDEs of melanoma and sunscreen.   Multiple lentigos on arms, legs and trunk. Advised benign, no treatment needed.  Multiple scattered angiomas. Advised benign, no treatment needed.   Seborrheic keratosis on arms, legs and trunk. Advised benign, no treatment needed.  Inflamed seborrheic keratosis on the left back x 3, abdomen x 2, right temple x 1. As physically tender cryosurgery performed. Advised on post op care.             Follow-up: FSE every 2-3 years/PRN sooner    1.) Patient was asked about new and changing moles. YES  2.) Patient received a complete physical skin examination: YES  3.) Patient was counseled to perform a monthly self skin examination: YES  Scribed By: Bee Cornelius MS, PA-C

## 2024-04-08 ENCOUNTER — ORDERS ONLY (AUTO-RELEASED) (OUTPATIENT)
Dept: FAMILY MEDICINE | Facility: CLINIC | Age: 64
End: 2024-04-08

## 2024-04-08 ENCOUNTER — OFFICE VISIT (OUTPATIENT)
Dept: FAMILY MEDICINE | Facility: CLINIC | Age: 64
End: 2024-04-08
Payer: COMMERCIAL

## 2024-04-08 VITALS
DIASTOLIC BLOOD PRESSURE: 76 MMHG | TEMPERATURE: 98.2 F | HEART RATE: 68 BPM | WEIGHT: 197.4 LBS | BODY MASS INDEX: 32.89 KG/M2 | SYSTOLIC BLOOD PRESSURE: 127 MMHG | RESPIRATION RATE: 16 BRPM | OXYGEN SATURATION: 94 % | HEIGHT: 65 IN

## 2024-04-08 DIAGNOSIS — Z11.4 SCREENING FOR HIV (HUMAN IMMUNODEFICIENCY VIRUS): ICD-10-CM

## 2024-04-08 DIAGNOSIS — Z12.4 CERVICAL CANCER SCREENING: ICD-10-CM

## 2024-04-08 DIAGNOSIS — E66.811 CLASS 1 OBESITY WITHOUT SERIOUS COMORBIDITY WITH BODY MASS INDEX (BMI) OF 33.0 TO 33.9 IN ADULT, UNSPECIFIED OBESITY TYPE: ICD-10-CM

## 2024-04-08 DIAGNOSIS — Z00.00 ROUTINE GENERAL MEDICAL EXAMINATION AT A HEALTH CARE FACILITY: Primary | ICD-10-CM

## 2024-04-08 DIAGNOSIS — Z85.3 PERSONAL HISTORY OF MALIGNANT NEOPLASM OF BREAST: ICD-10-CM

## 2024-04-08 DIAGNOSIS — G24.5 BLEPHAROSPASM OF BOTH EYES: ICD-10-CM

## 2024-04-08 DIAGNOSIS — Z12.11 SCREEN FOR COLON CANCER: ICD-10-CM

## 2024-04-08 DIAGNOSIS — Z13.220 LIPID SCREENING: ICD-10-CM

## 2024-04-08 DIAGNOSIS — Z11.59 NEED FOR HEPATITIS C SCREENING TEST: ICD-10-CM

## 2024-04-08 LAB
ERYTHROCYTE [DISTWIDTH] IN BLOOD BY AUTOMATED COUNT: 13.4 % (ref 10–15)
HBA1C MFR BLD: 5.5 % (ref 0–5.6)
HCT VFR BLD AUTO: 45.6 % (ref 35–47)
HGB BLD-MCNC: 14.3 G/DL (ref 11.7–15.7)
MCH RBC QN AUTO: 27.3 PG (ref 26.5–33)
MCHC RBC AUTO-ENTMCNC: 31.4 G/DL (ref 31.5–36.5)
MCV RBC AUTO: 87 FL (ref 78–100)
PLATELET # BLD AUTO: 253 10E3/UL (ref 150–450)
RBC # BLD AUTO: 5.24 10E6/UL (ref 3.8–5.2)
WBC # BLD AUTO: 10.1 10E3/UL (ref 4–11)

## 2024-04-08 PROCEDURE — 87624 HPV HI-RISK TYP POOLED RSLT: CPT | Performed by: FAMILY MEDICINE

## 2024-04-08 PROCEDURE — 99386 PREV VISIT NEW AGE 40-64: CPT | Performed by: FAMILY MEDICINE

## 2024-04-08 PROCEDURE — 80053 COMPREHEN METABOLIC PANEL: CPT | Performed by: FAMILY MEDICINE

## 2024-04-08 PROCEDURE — 80061 LIPID PANEL: CPT | Performed by: FAMILY MEDICINE

## 2024-04-08 PROCEDURE — 83036 HEMOGLOBIN GLYCOSYLATED A1C: CPT | Performed by: FAMILY MEDICINE

## 2024-04-08 PROCEDURE — 85027 COMPLETE CBC AUTOMATED: CPT | Performed by: FAMILY MEDICINE

## 2024-04-08 PROCEDURE — 84443 ASSAY THYROID STIM HORMONE: CPT | Performed by: FAMILY MEDICINE

## 2024-04-08 PROCEDURE — 87389 HIV-1 AG W/HIV-1&-2 AB AG IA: CPT | Performed by: FAMILY MEDICINE

## 2024-04-08 PROCEDURE — 86803 HEPATITIS C AB TEST: CPT | Performed by: FAMILY MEDICINE

## 2024-04-08 PROCEDURE — 36415 COLL VENOUS BLD VENIPUNCTURE: CPT | Performed by: FAMILY MEDICINE

## 2024-04-08 PROCEDURE — G0145 SCR C/V CYTO,THINLAYER,RESCR: HCPCS | Performed by: FAMILY MEDICINE

## 2024-04-08 SDOH — HEALTH STABILITY: PHYSICAL HEALTH: ON AVERAGE, HOW MANY DAYS PER WEEK DO YOU ENGAGE IN MODERATE TO STRENUOUS EXERCISE (LIKE A BRISK WALK)?: 2 DAYS

## 2024-04-08 SDOH — HEALTH STABILITY: PHYSICAL HEALTH: ON AVERAGE, HOW MANY MINUTES DO YOU ENGAGE IN EXERCISE AT THIS LEVEL?: 10 MIN

## 2024-04-08 ASSESSMENT — SOCIAL DETERMINANTS OF HEALTH (SDOH): HOW OFTEN DO YOU GET TOGETHER WITH FRIENDS OR RELATIVES?: ONCE A WEEK

## 2024-04-08 NOTE — PROGRESS NOTES
Preventive Care Visit  [facility]  Kamilah Aparicio MD, Family Medicine  Apr 8, 2024      1. Routine general medical examination at a health care facility  Xiomara comes in today to establish care and an annual physical.  She has not had preventative cares for the next 15 years now.  She declines further vaccines at this time but will consider doing flu, COVID, and RSV in the fall.  She is due for Pap smear.  We discussed colon cancer screening and she does not want to do a colonoscopy but she will do the Cologuard.  As far as mammogram, she was told she no longer needs them as she had a bilateral ostectomy with implants after breast cancer in 2012.    2. Screen for colon cancer  - COLOGUARD([employer] SCI[employer]ES); Future    3. Screening for HIV (human immunodeficiency virus)  - HIV Antigen Antibody Combo; Future  - HIV Antigen Antibody Combo    4. Need for hepatitis C screening test  - Hepatitis C Screen Reflex to HCV RNA Quant and Genotype; Future  - Hepatitis C Screen Reflex to HCV RNA Quant and Genotype    5. Cervical cancer screening  - Pap Screen with HPV - recommended age 30 - 65 years    6. Personal history of malignant neoplasm of breast  She has a history of bilateral mastectomy with chemotherapy back in 2012.    7. Lipid screening  Recommend blood work.  - Lipid panel reflex to direct LDL Non-fasting; Future  - CBC with platelets; Future  - Comprehensive metabolic panel; Future  - Lipid panel reflex to direct LDL Non-fasting  - CBC with platelets  - Comprehensive metabolic panel    8. Class 1 obesity without serious comorbidity with body mass index (BMI) of 33.0 to 33.9 in adult, unspecified obesity type  She plans on coming back to see me for comprehensive weight management intake.  - Hemoglobin A1c; Future  - TSH with free T4 reflex; Future  - Hemoglobin A1c  - TSH with free T4 reflex    9.Blepharospasm  She follows with a neurologist at [facility] and gets Botox injections every 10 to 12  omaira Solano is a 63 year old, presenting for the following:  Physical        4/8/2024    12:50 PM   Additional Questions   Roomed by Maritza BOONE LPN        Health Care Directive  Patient does not have a Health Care Directive or Living Will: Discussed advance care planning with patient; however, patient declined at this time.    KARINA Solano comes in today to establish care.  She has not had preventative care she thinks for about 15 years or so.  She thinks her last Pap smear was maybe when she was in her 20s.  She did have breast cancer in 2012 and had a bilateral mastectomy with implants.  She did go through chemotherapy at that time.  She really has not had other preventative screenings.  She does have a neurologist at [facility] that she sees regularly for Botox injections due to blepharospasm.  She denies any other medical conditions or surgeries in her past.  She is interested in weight loss.            4/8/2024   General Health   How would you rate your overall physical health? Good   Feel stress (tense, anxious, or unable to sleep) Only a little   (!) STRESS CONCERN      4/8/2024   Nutrition   Three or more servings of calcium each day? (!) I DON'T KNOW   Diet: Breakfast skipped   How many servings of fruit and vegetables per day? (!) 0-1   How many sweetened beverages each day? 0-1         4/8/2024   Exercise   Days per week of moderate/strenous exercise 2 days   Average minutes spent exercising at this level 10 min   (!) EXERCISE CONCERN      4/8/2024   Social Factors   Frequency of gathering with friends or relatives Once a week   Worry food won't last until get money to buy more No   Food not last or not have enough money for food? No   Do you have housing?  Yes   Are you worried about losing your housing? No   Lack of transportation? No   Unable to get utilities (heat,electricity)? No         4/8/2024   Dental   Dentist two times every year? (!) NO         4/8/2024   TB Screening   Were you born  "outside of the [place]? No         Today's PHQ-2 Score:       4/8/2024    12:51 PM   PHQ-2 ( 1999 Pf[employer])   Q1: Little interest or pleasure in doing things 0   Q2: Feeling down, depressed or hopeless 0   PHQ-2 Score 0   Q1: Little interest or pleasure in doing things Not at all   Q2: Feeling down, depressed or hopeless Not at all   PHQ-2 Score 0           4/8/2024   Substance Use   Alcohol more than 3/day or more than 7/wk No   Do you use any other substances recreationally? No     Social History     Tobacco Use    Smoking status: Never    Smokeless tobacco: Never   Substance Use Topics    Drug use: No             4/8/2024   Breast Cancer Screening   Family history of breast, colon, or ovarian cancer? No / Unknown      Mammogram Screening - Mammogram every 1-2 years updated in Health Maintenance based on mutual decision making          4/8/2024   One time HIV Screening   Previous HIV test? No         4/8/2024   STI Screening   New sexual partner(s) since last STI/HIV test? No     History of abnormal Pap smear: NO - age 30-65 PAP every 5 years with negative HPV co-testing recommended       ASCVD Risk   The ASCVD Risk score (Ana HOLT, et al., 2019) failed to calculate for the following reasons:    Cannot find a previous HDL lab    Cannot find a previous total cholesterol lab           Reviewed and updated as needed this visit by Provider                    Lab work is in process      Review of Systems  Constitutional, HEENT, cardiovascular, pulmonary, GI, , musculoskeletal, neuro, skin, endocrine and psych systems are negative, except as otherwise noted.     Objective    Exam  /76   Pulse 68   Temp 98.2  F (36.8  C) (Oral)   Resp 16   Ht 1.64 m (5' 4.57\")   Wt 89.5 kg (197 lb 6.4 oz)   LMP 10/04/2004   SpO2 94%   BMI 33.29 kg/m     Estimated body mass index is 33.29 kg/m  as calculated from the following:    Height as of this encounter: 1.64 m (5' 4.57\").    Weight as of this encounter: 89.5 kg " (197 lb 6.4 oz).    Physical Exam  GENERAL: alert and no distress  EYES: Eyes grossly normal to inspection, PERRL and conjunctivae and sclerae normal  HENT: ear canals and TM's normal, nose and mouth without ulcers or lesions  NECK: no adenopathy, no asymmetry, masses, or scars  RESP: lungs clear to auscultation - no rales, rhonchi or wheezes  BREAST: Bilateral mastectomy with implants.  No palpable masses.  CV: regular rate and rhythm, normal S1 S2, no S3 or S4, no murmur, click or rub, no peripheral edema  ABDOMEN: soft, nontender, no hepatosplenomegaly, no masses and bowel sounds normal   (female): normal female external genitalia, normal urethral meatus, normal vaginal mucosa  MS: no gross musculoskeletal defects noted, no edema, fairly significant varicose veins lower extremities  SKIN: no suspicious lesions or rashes  NEURO: Normal strength and tone, mentation intact and speech normal  PSYCH: mentation appears normal, affect normal/bright        Signed Electronically by: Kamilah Aparicio MD

## 2024-04-09 LAB
ALBUMIN SERPL BCG-MCNC: 4.1 G/DL (ref 3.5–5.2)
ALP SERPL-CCNC: 96 U/L (ref 40–150)
ALT SERPL W P-5'-P-CCNC: 10 U/L (ref 0–50)
ANION GAP SERPL CALCULATED.3IONS-SCNC: 10 MMOL/L (ref 7–15)
AST SERPL W P-5'-P-CCNC: 15 U/L (ref 0–45)
BILIRUB SERPL-MCNC: 0.3 MG/DL
BUN SERPL-MCNC: 19.2 MG/DL (ref 8–23)
CALCIUM SERPL-MCNC: 8.9 MG/DL (ref 8.8–10.2)
CHLORIDE SERPL-SCNC: 103 MMOL/L (ref 98–107)
CHOLEST SERPL-MCNC: 164 MG/DL
CREAT SERPL-MCNC: 0.86 MG/DL (ref 0.51–0.95)
DEPRECATED HCO3 PLAS-SCNC: 27 MMOL/L (ref 22–29)
EGFRCR SERPLBLD CKD-EPI 2021: 75 ML/MIN/1.73M2
FASTING STATUS PATIENT QL REPORTED: YES
GLUCOSE SERPL-MCNC: 87 MG/DL (ref 70–99)
HCV AB SERPL QL IA: NONREACTIVE
HDLC SERPL-MCNC: 55 MG/DL
HIV 1+2 AB+HIV1 P24 AG SERPL QL IA: NONREACTIVE
LDLC SERPL CALC-MCNC: 84 MG/DL
NONHDLC SERPL-MCNC: 109 MG/DL
POTASSIUM SERPL-SCNC: 4.3 MMOL/L (ref 3.4–5.3)
PROT SERPL-MCNC: 7 G/DL (ref 6.4–8.3)
SODIUM SERPL-SCNC: 140 MMOL/L (ref 135–145)
TRIGL SERPL-MCNC: 127 MG/DL
TSH SERPL DL<=0.005 MIU/L-ACNC: 0.95 UIU/ML (ref 0.3–4.2)

## 2024-04-10 LAB
BKR LAB AP GYN ADEQUACY: NORMAL
BKR LAB AP GYN INTERPRETATION: NORMAL
BKR LAB AP HPV REFLEX: NORMAL
BKR LAB AP PREVIOUS ABNORMAL: NORMAL
PATH REPORT.COMMENTS IMP SPEC: NORMAL
PATH REPORT.COMMENTS IMP SPEC: NORMAL
PATH REPORT.RELEVANT HX SPEC: NORMAL

## 2024-04-12 LAB
HUMAN PAPILLOMA VIRUS 16 DNA: NEGATIVE
HUMAN PAPILLOMA VIRUS 18 DNA: NEGATIVE
HUMAN PAPILLOMA VIRUS FINAL DIAGNOSIS: NORMAL
HUMAN PAPILLOMA VIRUS OTHER HR: NEGATIVE

## 2024-04-24 LAB — NONINV COLON CA DNA+OCC BLD SCRN STL QL: NEGATIVE

## 2024-05-29 NOTE — PROGRESS NOTES
"    New Medical Weight Management Consult    PATIENT:  Xiomara Maradiaga  MRN:         [id number]  :         1960  LUIS:         2024        I had the pleasure of seeing  Xiomara Maradiaga. Full intake/assessment was done to determine barriers to weight loss success and develop a treatment plan. Xiomara Maradiaga is a 63 year old female interested in treatment of medical problems associated with excess weight. She has a height of 5' 4.57\", a weight of 201 lbs 0 oz, and the calculated Body mass index is 33.9 kg/m .    ASSESSMENT/PLAN:  1. Class 1 obesity without serious comorbidity with body mass index (BMI) of 33.0 to 33.9 in adult, unspecified obesity type  Xiomara comes in today for comprehensive weight management intake.  Comorbidities include knee pain, lower extremity swelling, and breast cancer.  She was able to set some long-term and short-term goals as discussed below.  She is very interested in medications, however, her insurance does not cover any injectables.  She is otherwise healthy so we will start with a half a tab of phentermine and have her follow-up in 6 weeks.  At that time can increase the dose or add topiramate if needed.  Reviewed potential adverse side effects.  - phentermine (ADIPEX-P) 37.5 MG tablet; Take 1 tablet (37.5 mg) by mouth every morning (before breakfast)  Dispense: 30 tablet; Refill: 0    She has a long-term goal of losing 50 pounds.  I discussed with her that a more realistic goal would be about 30 pounds which would be 15% of her starting body weight.  She did struggle a little bit with short-term goals: We discussed possible suggestions in the categories below:  Behavioral: I suggested she start tracking her food intake with my fitness pal.  She is not sure she wants to do that at this time.  She is doing quite a bit of boredom eating so having her relief think about if she is really hungry.  She is doing a lot of snacking during the day.  Nutritional: I did discuss with her " guidelines for proteins and carbs but she is not sure she wants to track.  Would recommend she work on her snacking then which is usually sweets, ice cream, chocolate.  Would have her try to substitute a high-protein snack when she feels like she needs 1.  She is not getting many fruits or vegetables so would have her try to think about increasing that.  Activity: She is currently doing agility with her dogs for just short periods of time.  She will think about trying to add something longer and such as a longer walk.    She states that she started to struggle with her weight as an adult.  She said it has been very gradual around 3 to 5 pounds per year.  She does have a family history of obesity and her sisters.  3 of her grandparents  of diabetes.  One of her sisters is taking Ozempic and has lost a lot of weight with that.  Her nutritional recall is as follows:  Breakfast: Skips, just as coffee  Lunch: Half of a [place] Srinath sandwich with a brownie  Dinner: Often Chinese takeout or a steak and potato with 1 of 4 vegetables: Asparagus, broccoli, green beans, or carrots.  Snacks: Ice cream every night, chocolate or sweet snacks during the day.  She does not drink alcohol or sugar sweetened beverages.  She does feel like she does some boredom eating and it sounds like she might do some binge eating, she was little bit vague in that area but it does not sound like it is twice a week.  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  This is typically fairly sedentary.  She does do dog agility with her border [employer].  This is usually short brief episodes of exercise.  She has noticed that as she has gained weight, she feels more short of breath when she does this.  She broke her ankle years ago and has been reluctant to do a lot of exercise.  She has a stationary bike at home and stopped doing it as it was bothering her ankle.  She is precontemplative about adding any additional exercise such as  "walking or strength training.  It does not sound like she has sleep apnea.  She was very interested in talking about medication.  She states like she really does not want to change her eating as she enjoys eating.  I discussed with her that to have any sustained weight loss, she needs to make some lifestyle change.  The key will be for her for finding a balance.  Instead of having a sugary snack, maybe a healthy high-protein snack.  I suggested trying to add more fruits and vegetables.  She does struggle with internal motivation.    She has the following co-morbidities:        5/29/2024    10:46 PM   --   I have the following health issues associated with obesity None of the above   I have the following symptoms associated with obesity Knee Pain    Depression    Lower Extremity Swelling    Fatigue            No data to display                    5/29/2024    10:46 PM   Referring Provider   Please name the provider who referred you to Medical Weight Management  If you do not know, please answer \"I Don't Know\" Dr Aparicio           5/29/2024    10:46 PM   Weight History   How concerned are you about your weight? Very Concerned   I became overweight As an Adult   The following factors have contributed to my weight gain Eating Too Much   I have tried the following methods to lose weight Watching Portions or Calories    Exercise   My lowest weight since age 18 was 135   My highest weight since age 18 was 200   The most weight I have ever lost was (lbs) 25   I have the following family history of obesity/being overweight My mother is overweight    My father is overweight    One or more of my siblings are overweight    Many of my relatives are overweight   How has your weight changed over the last year? Gained           5/29/2024    10:46 PM   Diet Recall Review with Patient   If you do eat lunch, what types of food do you typically eat? Smith david   If you do eat supper, what types of food do you typically eat? Genny and " potatoes   If you do snack, what types of food do you typically eat? Sweets   How many glasses of juice do you drink in a typical day? 0   How many of glasses of milk do you drink in a typical day? 0   How many 8oz glasses of sugar containing drinks such as Catarino-Aid/sweet tea do you drink in a day? 0   How many cans/bottles of sugar pop/soda/tea/sports drinks do you drink in a day? 0   How many cans/bottles of diet pop/soda/tea or sports drink do you drink in a day? 0   How often do you have a drink of alcohol? Monthly or Less   If you do drink, how many drinks might you have in a day? 1 or 2           5/29/2024    10:46 PM   Eating Habits   Generally, my meals include foods like these bread, pasta, rice, potatoes, corn, crackers, sweet dessert, pop, or juice Almost Everyday   Generally, my meals include foods like these fried meats, brats, burgers, french fries, pizza, cheese, chips, or ice cream A Few Times a Week   Eat fast food (like McDonalds, Burger Toby, Taco Bell) Less Than Weekly   Eat at a buffet or sit-down restaurant Less Than Weekly   Eat most of my meals in front of the TV or computer Never   Often skip meals, eat at random times, have no regular eating times A Few Times a Week   Rarely sit down for a meal but snack or graze throughout Almost Everyday   Eat extra snacks between meals Everyday   Eat most of my food at the end of the day Never   Eat in the middle of the night or wake up at night to eat Never   Eat extra snacks to prevent or correct low blood sugar Never   Eat to prevent acid reflux or stomach pain Never   Worry about not having enough food to eat Never   I eat when I am depressed Never   I eat when I am stressed Once a Week   I eat when I am bored Everyday   I eat when I am anxious Less Than Weekly   I eat when I am happy or as a reward Once a Week   I feel hungry all the time even if I just have eaten Never   Feeling full is important to me Never   I finish all the food on my plate even  if I am already full Once a Week   I can't resist eating delicious food or walk past the good food/smell Everyday   I eat/snack without noticing that I am eating Everyday   I eat when I am preparing the meal Less Than Weekly   I have trouble not eating sweets, ice cream, cookies, or chips if they are around the house Everyday   I think about food all day Once a Week   What foods, if any, do you crave? Sweets/Candy/Chocolate           5/29/2024    10:46 PM   Amount of Food   I feel out of control when eating Almost Everyday   I eat a large amount of food, like a loaf of bread, a box of cookies, a pint/quart of ice cream, all at once Weekly   I eat a large amount of food even when I am not hungry Weekly   I eat rapidly Almost Everyday   I eat alone because I feel embarrassed and do not want others to see how much I have eaten Never   I eat until I am uncomfortably full Weekly   I feel bad, disgusted, or guilty after I overeat Everyday           5/29/2024    10:46 PM   Activity/Exercise History   How much of a typical 12 hour day do you spend sitting? Most of the Day   How much of a typical 12 hour day do you spend lying down? Less Than Half the Day   How much of a typical day do you spend walking/standing? Less Than Half the Day   How many hours (not including work) do you spend on the TV/Video Games/Computer/Tablet/Phone? 4-5 Hours   How many times a week are you active for the purpose of exercise? 4-5 TImes a Week   What keeps you from being more active? Pain    Shortness of Breath   How many total minutes do you spend doing some activity for the purpose of exercising when you exercise? Less Than 15 Minutes       PAST MEDICAL HISTORY:  Past Medical History:   Diagnosis Date    Breast cancer (H) 06/25/2012    An ultrasound-guided biopsy was done on 05/22/2012 and results showed that there were 2 lesions with an invasive ductal carcinoma grade 2 with absent angiolymphatic invasion. Estrogen and progesterone ER and [place]  "were also negative and HER-2 was negative as well so triple negative. The third nodule in the right breast that was biopsied was negative for atypia or malignancy. MRI has been done on 06/0    Malignant neoplasm (H) 05/22/2012    right breast cancer           5/29/2024    10:46 PM   Work/Social History Reviewed With Patient   My employment status is Full-Time   My job is Sales   How much of your job is spent on the computer or phone? Less Than 50%   How many hours do you spend commuting to work daily? 2   What is your marital status? /In a Relationship   If in a relationship, is your significant other overweight? No   Who do you live with?    Who does the food shopping? Me           5/29/2024    10:46 PM   Mental Health History Reviewed With Patient   Have you ever been physically or sexually abused? No   How often in the past 2 weeks have you felt little interest or pleasure in doing things? More Than Half the Days   Over the past 2 weeks how often have you felt down, depressed, or hopeless? More Than Half the Days           5/29/2024    10:46 PM   Sleep History Reviewed With Patient   How many hours do you sleep at night? 8       MEDICATIONS:   No current outpatient medications on file.       ALLERGIES:   Allergies   Allergen Reactions    Bees Hives    Penicillins Rash       PHYSICAL EXAM:  /68   Pulse 71   Resp 18   Ht 1.64 m (5' 4.57\")   Wt 91.2 kg (201 lb)   LMP 10/04/2004   SpO2 96%   BMI 33.90 kg/m      Waist circumference:      Wt Readings from Last 4 Encounters:   05/30/24 91.2 kg (201 lb)   04/08/24 89.5 kg (197 lb 6.4 oz)   10/01/20 88.5 kg (195 lb)   04/24/17 89.9 kg (198 lb 3.2 oz)     A & O x 3  HEENT: NCAT, mucous membranes moist  Respirations unlabored  Location of obesity: Mixed Obesity    FOLLOW-UP:   6 weeks.    TIME: 55 min spent on evaluation, management, counseling, education, & motivational interviewing with greater than 50 % of the total time was spent on counseling " and coordinating care    Sincerely,    Kamilah Aparicio MD

## 2024-05-30 ENCOUNTER — OFFICE VISIT (OUTPATIENT)
Dept: FAMILY MEDICINE | Facility: CLINIC | Age: 64
End: 2024-05-30
Payer: COMMERCIAL

## 2024-05-30 VITALS
BODY MASS INDEX: 33.49 KG/M2 | SYSTOLIC BLOOD PRESSURE: 137 MMHG | DIASTOLIC BLOOD PRESSURE: 68 MMHG | OXYGEN SATURATION: 96 % | RESPIRATION RATE: 18 BRPM | WEIGHT: 201 LBS | HEART RATE: 71 BPM | HEIGHT: 65 IN

## 2024-05-30 DIAGNOSIS — E66.811 CLASS 1 OBESITY WITH SERIOUS COMORBIDITY AND BODY MASS INDEX (BMI) OF 33.0 TO 33.9 IN ADULT, UNSPECIFIED OBESITY TYPE: Primary | ICD-10-CM

## 2024-05-30 PROCEDURE — 99215 OFFICE O/P EST HI 40 MIN: CPT | Performed by: FAMILY MEDICINE

## 2024-05-30 PROCEDURE — 99417 PROLNG OP E/M EACH 15 MIN: CPT | Performed by: FAMILY MEDICINE

## 2024-05-30 PROCEDURE — G2211 COMPLEX E/M VISIT ADD ON: HCPCS | Performed by: FAMILY MEDICINE

## 2024-05-30 RX ORDER — PHENTERMINE HYDROCHLORIDE 37.5 MG/1
37.5 TABLET ORAL
Qty: 30 TABLET | Refills: 0 | Status: SHIPPED | OUTPATIENT
Start: 2024-05-30 | End: 2024-07-15

## 2024-07-12 ENCOUNTER — NURSE TRIAGE (OUTPATIENT)
Dept: NURSING | Facility: CLINIC | Age: 64
End: 2024-07-12
Payer: COMMERCIAL

## 2024-07-12 ENCOUNTER — HOSPITAL ENCOUNTER (EMERGENCY)
Facility: HOSPITAL | Age: 64
Discharge: HOME | End: 2024-07-12
Attending: EMERGENCY MEDICINE | Admitting: EMERGENCY MEDICINE
Payer: COMMERCIAL

## 2024-07-12 VITALS
SYSTOLIC BLOOD PRESSURE: 151 MMHG | WEIGHT: 195 LBS | BODY MASS INDEX: 32.49 KG/M2 | DIASTOLIC BLOOD PRESSURE: 74 MMHG | RESPIRATION RATE: 18 BRPM | HEIGHT: 65 IN | OXYGEN SATURATION: 98 % | HEART RATE: 65 BPM | TEMPERATURE: 97.1 F

## 2024-07-12 DIAGNOSIS — T78.2XXA ANAPHYLAXIS, INITIAL ENCOUNTER: ICD-10-CM

## 2024-07-12 DIAGNOSIS — L50.9 URTICARIA: ICD-10-CM

## 2024-07-12 PROCEDURE — 250N000012 HC RX MED GY IP 250 OP 636 PS 637: Performed by: EMERGENCY MEDICINE

## 2024-07-12 PROCEDURE — 99285 EMERGENCY DEPT VISIT HI MDM: CPT | Mod: 25

## 2024-07-12 PROCEDURE — 96372 THER/PROPH/DIAG INJ SC/IM: CPT

## 2024-07-12 PROCEDURE — 250N000013 HC RX MED GY IP 250 OP 250 PS 637: Performed by: EMERGENCY MEDICINE

## 2024-07-12 PROCEDURE — 250N000009 HC RX 250: Performed by: EMERGENCY MEDICINE

## 2024-07-12 RX ORDER — FAMOTIDINE 20 MG/1
TABLET, FILM COATED ORAL
Status: COMPLETED
Start: 2024-07-12 | End: 2024-07-12

## 2024-07-12 RX ORDER — EPINEPHRINE 1 MG/ML
0.3 INJECTION, SOLUTION INTRAMUSCULAR; SUBCUTANEOUS ONCE
Status: COMPLETED | OUTPATIENT
Start: 2024-07-12 | End: 2024-07-12

## 2024-07-12 RX ORDER — EPINEPHRINE 0.3 MG/.3ML
0.3 INJECTION SUBCUTANEOUS PRN
Qty: 0.6 ML | Refills: 0 | Status: SHIPPED | OUTPATIENT
Start: 2024-07-12

## 2024-07-12 RX ORDER — PREDNISONE 50 MG/1
50 TABLET ORAL DAILY
Qty: 4 TABLET | Refills: 0 | Status: SHIPPED | OUTPATIENT
Start: 2024-07-12 | End: 2024-07-15

## 2024-07-12 RX ADMIN — EPINEPHRINE 0.3 MG: 1 INJECTION, SOLUTION INTRAMUSCULAR; SUBCUTANEOUS at 21:14

## 2024-07-12 RX ADMIN — EPINEPHRINE 0.3 MG: 1 INJECTION INTRAMUSCULAR; INTRAVENOUS; SUBCUTANEOUS at 21:14

## 2024-07-12 RX ADMIN — PREDNISONE 50 MG: 20 TABLET ORAL at 21:17

## 2024-07-12 RX ADMIN — FAMOTIDINE 20 MG: 20 TABLET ORAL at 21:23

## 2024-07-12 ASSESSMENT — ACTIVITIES OF DAILY LIVING (ADL)
ADLS_ACUITY_SCORE: 35
ADLS_ACUITY_SCORE: 33

## 2024-07-12 NOTE — ED TRIAGE NOTES
Pt arrives due to being stung by bees several times @ 1730 with hives all over body. Pt denies throat swelling or SOB. Pt took 2 benadryl at home.      Triage Assessment (Adult)       Row Name 07/12/24 4170          Triage Assessment    Airway WDL WDL        Respiratory WDL    Respiratory WDL WDL        Cardiac WDL    Cardiac WDL WDL        Peripheral/Neurovascular WDL    Peripheral Neurovascular WDL WDL        Cognitive/Neuro/Behavioral WDL    Cognitive/Neuro/Behavioral WDL WDL

## 2024-07-12 NOTE — TELEPHONE ENCOUNTER
"Nurse Triage SBAR    Is this a 2nd Level Triage? NO    Situation:   Multiple bee/wasp sting    Background:   Pt had a reaction in the past.  Doesn't report it was anaphylactic but did end up in the ED.    Assessment:   Pt got stung about 30 mins prior to calling.  About 3 stings on her back.  She is now feeling \"burning\" and a rash all over her body.  Denies difficulty breathing or swallowing.    Protocol Recommended Disposition:   Go to ED Now    Recommendation:   Advised to be seen in the ED now.  Assisted in searching for [facility] nearby.  [facility] address was given.  Pt plans to drive herself.  Care advice given.  Pt verbalized understanding.    Zoe Pulido RN, BSN Nurse Triage Advisor 7/12/2024 6:16 PM      Reason for Disposition   [1] Hives, itching, or swelling elsewhere on body (i.e., not a site of sting) AND [2] started within 2 hours of sting  (Exception: Only at site of sting.)    Additional Information   Negative: [1] Life-threatening reaction (anaphylaxis) in the past to sting AND [2] < 2 hours since sting   Negative: Attacked by swarm of bees now   Negative: Passed out (i.e., lost consciousness, collapsed and was not responding)   Negative: Wheezing, stridor, or difficulty breathing   Negative: [1] Hoarseness or cough AND [2] sudden onset following sting   Negative: [1] Tightness in the throat or chest AND [2] sudden onset following sting   Negative: [1] Swollen tongue or difficulty swallowing AND [2] sudden onset following sting   Negative: Sounds like a life-threatening emergency to the triager   Negative: Not a bee, wasp, hornet, or yellow jacket sting   Negative: Ring stuck on swollen finger (or toe) following bee sting    Protocols used: Bee or Yellow Jacket Sting-A-AH    "

## 2024-07-13 NOTE — DISCHARGE INSTRUCTIONS
You were seen in the Emergency Department today for evaluation of an allergic reaction to bee stings.  You were prescribed epinephrine and prednisone.  You can take Pepcid once or twice a day for the next 5 days as well. You should take all medications as prescribed.  Follow up with your primary care physician to ensure resolution of symptoms. Return if you have new or worsening symptoms.

## 2024-07-13 NOTE — ED NOTES
"Pt eports feeling a lot better than when she came in \"feel like a new person\"  rash/redness remains.  "

## 2024-07-13 NOTE — ED PROVIDER NOTES
EMERGENCY DEPARTMENT ENCOUNTER      NAME: Xiomara Maradiaga  AGE: 63 year old female  YOB: 1960  MRN: [id number]  EVALUATION DATE & TIME: No admission date for patient encounter.    PCP: Kamilah Aparicio    ED PROVIDER: Ariana Harper M.D.      Chief Complaint   Patient presents with    Insect Bite    Allergic Reaction     FINAL IMPRESSION:  1. Anaphylaxis, initial encounter    2. Urticaria      ED COURSE & MEDICAL DECISION MAKING:    Pertinent Labs & Imaging studies reviewed. (See chart for details)  ED Course as of 07/12/24 2252 Fri Jul 12, 2024 2020 Patient is a very pleasant 63-year-old female comes in today for evaluation after she was stung by a bee around 530.  She was stung in multiple areas.  She has some swelling to her upper back and both of her arms.  She has hives on both of her arms.  She says there is hives on her legs.  She had taken Benadryl and Claritin but still having hives and swelling.  She had a little bit of nausea and may be felt like her chest was tight initially although that has since resolved.  I am can order her some famotidine and prednisone and give her some IM epinephrine.  I discussed this with her and she is in agreement with the plan.  I think this is a borderline anaphylaxis but since she has not had full response to Benadryl and has nausea in addition to the skin changes I think this would qualify.  I think she would be a candidate to go home with an EpiPen later as well.  She is comfortable with the plan.   2131 Check back in on the patient.  She is feeling quite a bit better after the epinephrine.  She feels that tightness in her chest has completely resolved now and her head feels more clear.  I do think this was anaphylaxis.  I am to send her with a prescription for an EpiPen and a few days of steroids.  She is in agreement with the plan.       Medical Decision Making    History:  Supplemental history from: N/A  External Record(s) reviewed: I reviewed the note  from none    Work Up:  Emergent/Severe conditions considered and evaluated for: Anaphylaxis  I independently reviewed and interpreted none  In additional to work up documented, I considered the following work up: None  Medications given that require intensive monitoring for toxicity: IM epinephrine    External consultation:  Discussion of management with another provider: None    Complicating factors:  Care impacted by chronic illness: breast cancer  Care affected by social determinants of health: N/A    Disposition considerations: Discharge  Prescriptions considered/prescribed: Epinephrine, prednisone    At the conclusion of the encounter I discussed  the results of all of the tests and the disposition with patient.   All questions were answered.  The patient acknowledged understanding and was involved in the decision making regarding the overall care plan.      I discussed with patient the utility, limitations and findings of the exam/interventions/studies done during this visit as well as the list of differential diagnosis and symptoms to monitor/return to ER for.  Additional verbal discharge instructions were provided.     15 minutes of critical care time     MEDICATIONS GIVEN IN THE EMERGENCY:  Medications   predniSONE (DELTASONE) tablet 50 mg (50 mg Oral $Given 7/12/24 2117)   famotidine (PEPCID) injection 20 mg (20 mg Intravenous Not Given 7/12/24 2124)   EPINEPHrine (Anaphylaxis) (ADRENALIN) injection (vial) 0.3 mg (0.3 mg Intramuscular $Given 7/12/24 2114)   famotidine (PEPCID) 20 MG tablet (20 mg  $Given 7/12/24 2123)       NEW PRESCRIPTIONS STARTED AT TODAY'S ER VISIT  Discharge Medication List as of 7/12/2024 10:09 PM        START taking these medications    Details   EPINEPHrine (ANY BX GENERIC EQUIV) 0.3 MG/0.3ML injection 2-pack Inject 0.3 mLs (0.3 mg) into the muscle as needed for anaphylaxis May repeat one time in 5-15 minutes if response to initial dose is inadequate., Disp-0.6 mL, R-0, E-Prescribe       predniSONE (DELTASONE) 50 MG tablet Take 1 tablet (50 mg) by mouth daily for 4 days, Disp-4 tablet, R-0, E-Prescribe                =================================================================    HPI    Triage Note: Pt arrives due to being stung by bees several times @ 1730 with hives all over body. Pt denies throat swelling or SOB. Pt took 2 benadryl at home.      Triage Assessment (Adult)       Row Name 07/12/24 5950          Triage Assessment    Airway WDL WDL        Respiratory WDL    Respiratory WDL WDL        Cardiac WDL    Cardiac WDL WDL        Peripheral/Neurovascular WDL    Peripheral Neurovascular WDL WDL        Cognitive/Neuro/Behavioral WDL    Cognitive/Neuro/Behavioral WDL WDL                         Patient information was obtained from: patient    Use of : N/A       Xiomara Maradiaga is a 63 year old female who presents via walk-in for evaluation of insect bite with allergic reaction.    At 5:30 PM today, the patient was stung by multiple bees/wasps on her back, arms, and legs. She soon developed itchy areas of redness where she was stung. She had some tightness in her chest as well. She used Benadryl initially, and later took Claritin as well. Now, her chest tightness is gone, but she endorses pain in the sting areas, as well as nausea without vomiting and dizziness.    The patient had a prior reaction to multiple bee stings about 30 years ago. She denies tongue swelling and throat swelling. She has no other medical issues.     PAST MEDICAL HISTORY:  Past Medical History:   Diagnosis Date    Breast cancer (H) 06/25/2012    An ultrasound-guided biopsy was done on 05/22/2012 and results showed that there were 2 lesions with an invasive ductal carcinoma grade 2 with absent angiolymphatic invasion. Estrogen and progesterone ER and ME were also negative and HER-2 was negative as well so triple negative. The third nodule in the right breast that was biopsied was negative for atypia or  malignancy. MRI has been done on 06/0    Malignant neoplasm (H) 05/22/2012    right breast cancer       PAST SURGICAL HISTORY:  Past Surgical History:   Procedure Laterality Date    INSERT PORT VASCULAR ACCESS  6/25/2012    Procedure: INSERT PORT VASCULAR ACCESS;;  Surgeon: Hallie Parker MD;  Location:  OR    INSERT TISSUE EXPANDER BREAST BILATERAL  6/25/2012    Procedure: INSERT TISSUE EXPANDER BREAST BILATERAL;;  Surgeon: Jake Aranda MD;  Location:  OR    MASTECTOMY SIMPLE, SENTINEL NODE, COMBINED  6/25/2012    Procedure: COMBINED MASTECTOMY SIMPLE, SENTINEL NODE;  BILATERAL  MASTECTOMY WITH RIGHT AXILLARY SENTINEL NODE BIOPSY, PORT PLACEMENT (YONATAN)  BILATERAL FIRST STAGE BREAST RECONSTRUCTION WITH TISSUE EXPANDERS (DEJAN);  Surgeon: Hallie Parker MD;  Location:  OR    RECONSTRUCT BREAST BILATERAL  2/28/2013    Procedure: RECONSTRUCT BREAST BILATERAL;  BILATERAL SECOND STAGE BREAST RECONSTRUCTION WITH SILICONE IMPLANTS, PORT REMOVAL.;  Surgeon: Jake Aranda MD;  Location: [facility]    REMOVE PORT VASCULAR ACCESS  2/28/2013    Procedure: REMOVE PORT VASCULAR ACCESS;;  Surgeon: Jake Aranda MD;  Location: [facility] ORAL SURGERY PROCEDURE  1996       CURRENT MEDICATIONS:    No current facility-administered medications for this encounter.    Current Outpatient Medications:     EPINEPHrine (ANY BX GENERIC EQUIV) 0.3 MG/0.3ML injection 2-pack, Inject 0.3 mLs (0.3 mg) into the muscle as needed for anaphylaxis May repeat one time in 5-15 minutes if response to initial dose is inadequate., Disp: 0.6 mL, Rfl: 0    predniSONE (DELTASONE) 50 MG tablet, Take 1 tablet (50 mg) by mouth daily for 4 days, Disp: 4 tablet, Rfl: 0    phentermine (ADIPEX-P) 37.5 MG tablet, Take 1 tablet (37.5 mg) by mouth every morning (before breakfast), Disp: 30 tablet, Rfl: 0    ALLERGIES:  Allergies   Allergen Reactions    Bees Hives    Penicillins Rash       FAMILY HISTORY:  Family History   Problem  Relation Age of Onset    Breast Cancer Mother     Heart Disease Maternal Grandfather         heart attack in his 70's    Diabetes Paternal Grandmother     Diabetes Paternal Grandfather        SOCIAL HISTORY:   Social History     Socioeconomic History    Marital status:    Tobacco Use    Smoking status: Never    Smokeless tobacco: Never   Vaping Use    Vaping status: Never Used   Substance and Sexual Activity    Drug use: No    Sexual activity: Yes     Partners: Male     Social Determinants of Health     Financial Resource Strain: Low Risk  (4/8/2024)    Financial Resource Strain     Within the past 12 months, have you or your family members you live with been unable to get utilities (heat, electricity) when it was really needed?: No   Food Insecurity: Low Risk  (4/8/2024)    Food Insecurity     Within the past 12 months, did you worry that your food would run out before you got money to buy more?: No     Within the past 12 months, did the food you bought just not last and you didn t have money to get more?: No   Transportation Needs: Low Risk  (4/8/2024)    Transportation Needs     Within the past 12 months, has lack of transportation kept you from medical appointments, getting your medicines, non-medical meetings or appointments, work, or from getting things that you need?: No   Physical Activity: Insufficiently Active (4/8/2024)    Exercise Vital Sign     Days of Exercise per Week: 2 days     Minutes of Exercise per Session: 10 min   Stress: No Stress Concern Present (4/8/2024)    [place] [employer] - Occupational Stress Questionnaire     Feeling of Stress : Only a little   Social Connections: Unknown (4/8/2024)    Social Connection and Isolation Panel [NHANES]     Frequency of Social Gatherings with Friends and Family: Once a week   Interpersonal Safety: Low Risk  (4/8/2024)    Interpersonal Safety     Do you feel physically and emotionally safe where you currently live?: Yes      "Within the past 12 months, have you been hit, slapped, kicked or otherwise physically hurt by someone?: No     Within the past 12 months, have you been humiliated or emotionally abused in other ways by your partner or ex-partner?: No   Housing Stability: Low Risk  (4/8/2024)    Housing Stability     Do you have housing? : Yes     Are you worried about losing your housing?: No       PHYSICAL EXAM    VITAL SIGNS: BP (!) 151/74   Pulse 65   Temp 97.1  F (36.2  C) (Oral)   Resp 18   Ht 1.651 m (5' 5\")   Wt 88.5 kg (195 lb)   LMP 10/04/2004   SpO2 98%   BMI 32.45 kg/m     GENERAL: Awake, alert, answering questions appropriately,   SPEECH:  Easy to understand speech, Normal volume and vilma  PULMONARY: No respiratory distress, Lungs clear to auscultation bilaterally. No wheezing.  CARDIOVASCULAR: Regular rate and rhythm, Distal pulses present and normal.  ABDOMINAL: Soft, Nondistended, Nontender, No rebound or guarding, No palpable masses  EXTREMITIES: No lower extremity edema.  Scattered hives to upper and lower extremities.  INTEGUMENT: Area of erythema on the left upper back, erythema up the left arm, and erythema up the right arm. No swelling in the mouth.  PSYCH: Normal mood and affect       I, Teddy Baron, am serving as a scribe to document services personally performed by Dr. Harper based on my observation and the provider's statements to me. I, Ariana Harper MD attest that Teddy Baron is acting in a scribe capacity, has observed my performance of the services and has documented them in accordance with my direction.    Ariana Harper M.D.  Emergency Medicine  [facility] EMERGENCY DEPARTMENT  [facility] [facility] [address]  [phone]  Dept: [phone]       Ariana Harper MD  07/12/24 2253    "

## 2024-07-15 ENCOUNTER — OFFICE VISIT (OUTPATIENT)
Dept: FAMILY MEDICINE | Facility: CLINIC | Age: 64
End: 2024-07-15
Payer: COMMERCIAL

## 2024-07-15 VITALS
DIASTOLIC BLOOD PRESSURE: 72 MMHG | HEART RATE: 71 BPM | WEIGHT: 197.4 LBS | BODY MASS INDEX: 32.89 KG/M2 | HEIGHT: 65 IN | RESPIRATION RATE: 16 BRPM | OXYGEN SATURATION: 95 % | SYSTOLIC BLOOD PRESSURE: 128 MMHG

## 2024-07-15 DIAGNOSIS — E66.811 CLASS 1 OBESITY WITH SERIOUS COMORBIDITY AND BODY MASS INDEX (BMI) OF 33.0 TO 33.9 IN ADULT, UNSPECIFIED OBESITY TYPE: ICD-10-CM

## 2024-07-15 PROCEDURE — 99214 OFFICE O/P EST MOD 30 MIN: CPT | Performed by: FAMILY MEDICINE

## 2024-07-15 RX ORDER — PHENTERMINE HYDROCHLORIDE 37.5 MG/1
37.5 TABLET ORAL
Qty: 30 TABLET | Refills: 1 | Status: SHIPPED | OUTPATIENT
Start: 2024-07-15 | End: 2024-09-09

## 2024-07-15 NOTE — PROGRESS NOTES
"    Return Medical Weight Management Note     Xiomara Maradiaga  MRN:  [id number]  :  1960  LUIS:  7/15/2024        I had the pleasure of seeing  Xiomara Maradiaga. She is a 63 year old female who I am continuing to see for treatment of obesity related to:        2024    10:46 PM   --   I have the following health issues associated with obesity None of the above   I have the following symptoms associated with obesity Knee Pain    Depression    Lower Extremity Swelling    Fatigue       INTERVAL HISTORY:  Xiomara returns for comprehensive weight management follow-up.  Her intake weight in May was 201.  Today she is at 197 with a 4 pound weight loss.  She is been taking half a tab of phentermine.  She feels like initially it was helpful in keeping her portions down but then she feels like she cannot get used to it and she has been back to her or old habits.  She admits that she eats a lot of sugary foods.  She feels like she eats more sugary food than regular food.  I asked for an example and she said she had cookies for breakfast this morning and she is having ice cream every night.  Her  has gone through bone marrow transplant and is having trouble keeping weight on so they have that type of food in the house for him.  We had a discussion regarding motivation and lifestyle change.  We can certainly increase the dose of phentermine, but would recommend trying to work on changing some of these habits whether she just does not have it in the house so she tries to only have it once or twice a week instead of every day.  Her activity level is about the same.  She does do some obedience classes with her dogs and she has a pool but if it is raining she has not had any exercise on those days.    CURRENT WEIGHT:   197 lbs 6.4 oz                       No data to display                VITALS:  /72   Pulse 71   Resp 16   Ht 1.638 m (5' 4.5\")   Wt 89.5 kg (197 lb 6.4 oz)   LMP 10/04/2004   SpO2 95%   BMI " 33.36 kg/m      MEDICATIONS:   Current Outpatient Medications   Medication Sig Dispense Refill    EPINEPHrine (ANY BX GENERIC EQUIV) 0.3 MG/0.3ML injection 2-pack Inject 0.3 mLs (0.3 mg) into the muscle as needed for anaphylaxis May repeat one time in 5-15 minutes if response to initial dose is inadequate. 0.6 mL 0    phentermine (ADIPEX-P) 37.5 MG tablet Take 1 tablet (37.5 mg) by mouth every morning (before breakfast) 30 tablet 1            No data to display                ASSESSMENT:   1. Class 1 obesity with serious comorbidity and body mass index (BMI) of 33.0 to 33.9 in adult, unspecified obesity type  Xiomara has lost a total of 4 pounds since her intake.  She felt like the half a tab of phentermine was helpful for the first couple weeks and then has been less effective recently.  Will increase the dose to a full tab as she is not having any adverse side effects.  We did a lot of discussion about lifestyle change and trying to decrease the amount of sugar that she is having daily.  She plans on trying to work on this but definitely struggles.  Follow-up in 8 weeks.  - phentermine (ADIPEX-P) 37.5 MG tablet; Take 1 tablet (37.5 mg) by mouth every morning (before breakfast)  Dispense: 30 tablet; Refill: 1      FOLLOW-UP:    8 weeks.        Sincerely,    Kamilah Aparicio MD

## 2024-09-09 ENCOUNTER — OFFICE VISIT (OUTPATIENT)
Dept: FAMILY MEDICINE | Facility: CLINIC | Age: 64
End: 2024-09-09
Payer: COMMERCIAL

## 2024-09-09 VITALS
WEIGHT: 188.8 LBS | OXYGEN SATURATION: 99 % | HEART RATE: 68 BPM | RESPIRATION RATE: 16 BRPM | HEIGHT: 65 IN | BODY MASS INDEX: 31.46 KG/M2 | DIASTOLIC BLOOD PRESSURE: 73 MMHG | SYSTOLIC BLOOD PRESSURE: 127 MMHG

## 2024-09-09 DIAGNOSIS — E66.811 CLASS 1 OBESITY WITH SERIOUS COMORBIDITY AND BODY MASS INDEX (BMI) OF 31.0 TO 31.9 IN ADULT, UNSPECIFIED OBESITY TYPE: ICD-10-CM

## 2024-09-09 PROCEDURE — 99214 OFFICE O/P EST MOD 30 MIN: CPT | Performed by: FAMILY MEDICINE

## 2024-09-09 PROCEDURE — G2211 COMPLEX E/M VISIT ADD ON: HCPCS | Performed by: FAMILY MEDICINE

## 2024-09-09 RX ORDER — TOPIRAMATE 25 MG/1
25 TABLET, FILM COATED ORAL 2 TIMES DAILY
Qty: 60 TABLET | Refills: 3 | Status: SHIPPED | OUTPATIENT
Start: 2024-09-09

## 2024-09-09 RX ORDER — PHENTERMINE HYDROCHLORIDE 37.5 MG/1
37.5 TABLET ORAL
Qty: 30 TABLET | Refills: 1 | Status: SHIPPED | OUTPATIENT
Start: 2024-09-09

## 2024-09-09 NOTE — PROGRESS NOTES
Return Medical Weight Management Note     Xiomara Maradiaga  MRN:  [id number]  :  1960  LUIS:  2024      I had the pleasure of seeing Xiomara Maradiaga. She is a 63 year old female who I am continuing to see for treatment of obesity related to:        2024    10:46 PM   --   I have the following health issues associated with obesity None of the above   I have the following symptoms associated with obesity Knee Pain    Depression    Lower Extremity Swelling    Fatigue       INTERVAL HISTORY:  Xiomara returns for comprehensive weight management follow-up.  Her intake weight in May was 201 when I saw her in July she was down to 197 and today she is down to 188.  She is lost a total of 13 pounds, 9 being in the last interval.  She has been on phentermine a full tab since I saw her last.  She states occasionally maybe twice a month she will have a little bit of insomnia so it is not a deal breaker for her.  She really struggles with sugar cravings.  She states that her appetite is well-controlled during the day but she is finding at night that she has sugar cravings.  She has been eating a lot of ice cream and things like that.  We discussed strategies such as not having it in the house but she states that she would drive to the store at night the sugar cravings are still strong.  We discussed adding topiramate to see if that helps.  She will start with just 1 tab for the first week and then bump it up to twice daily.  Reviewed potential adverse side effects.  She was interested in Zepbound or Wegovy, but it does not look like it is covered on her insurance.      The longitudinal plan of care for the diagnosis(es)/condition(s) as documented were addressed during this visit. Due to the added complexity in care, I will continue to support Xiomara in the subsequent management and with ongoing continuity of care.   CURRENT WEIGHT:   188 lbs 12.8 oz                       No data to display                VITALS:  BP  "127/73   Pulse 68   Resp 16   Ht 1.64 m (5' 4.57\")   Wt 85.6 kg (188 lb 12.8 oz)   LMP 10/04/2004   SpO2 99%   BMI 31.84 kg/m      MEDICATIONS:   Current Outpatient Medications   Medication Sig Dispense Refill    phentermine (ADIPEX-P) 37.5 MG tablet Take 1 tablet (37.5 mg) by mouth every morning (before breakfast). 30 tablet 1    topiramate (TOPAMAX) 25 MG tablet Take 1 tablet (25 mg) by mouth 2 times daily. 60 tablet 3    EPINEPHrine (ANY BX GENERIC EQUIV) 0.3 MG/0.3ML injection 2-pack Inject 0.3 mLs (0.3 mg) into the muscle as needed for anaphylaxis May repeat one time in 5-15 minutes if response to initial dose is inadequate. (Patient not taking: Reported on 9/9/2024) 0.6 mL 0            No data to display                ASSESSMENT/Plan  1. Class 1 obesity with serious comorbidity and body mass index (BMI) of 31.0 to 31.9 in adult, unspecified obesity type  She is now lost about 13 pounds with phentermine.  She really struggles with sugar cravings will add topiramate.  She will then follow-up in 8 weeks.  - phentermine (ADIPEX-P) 37.5 MG tablet; Take 1 tablet (37.5 mg) by mouth every morning (before breakfast).  Dispense: 30 tablet; Refill: 1  - topiramate (TOPAMAX) 25 MG tablet; Take 1 tablet (25 mg) by mouth 2 times daily.  Dispense: 60 tablet; Refill: 3        FOLLOW-UP:    8 weeks.        Sincerely,    Kamilah Aparicio MD  "

## 2025-01-13 ENCOUNTER — IMMUNIZATION (OUTPATIENT)
Dept: FAMILY MEDICINE | Facility: CLINIC | Age: 65
End: 2025-01-13
Payer: COMMERCIAL

## 2025-01-13 PROCEDURE — 90673 RIV3 VACCINE NO PRESERV IM: CPT

## 2025-01-13 PROCEDURE — 90471 IMMUNIZATION ADMIN: CPT

## 2025-03-10 ENCOUNTER — PATIENT OUTREACH (OUTPATIENT)
Dept: CARE COORDINATION | Facility: CLINIC | Age: 65
End: 2025-03-10
Payer: COMMERCIAL

## 2025-03-24 ENCOUNTER — PATIENT OUTREACH (OUTPATIENT)
Dept: CARE COORDINATION | Facility: CLINIC | Age: 65
End: 2025-03-24
Payer: COMMERCIAL

## 2025-05-11 ENCOUNTER — HEALTH MAINTENANCE LETTER (OUTPATIENT)
Age: 65
End: 2025-05-11